# Patient Record
Sex: MALE | Race: WHITE | NOT HISPANIC OR LATINO | Employment: OTHER | URBAN - METROPOLITAN AREA
[De-identification: names, ages, dates, MRNs, and addresses within clinical notes are randomized per-mention and may not be internally consistent; named-entity substitution may affect disease eponyms.]

---

## 2017-10-18 ENCOUNTER — HOSPITAL ENCOUNTER (OUTPATIENT)
Facility: MEDICAL CENTER | Age: 62
End: 2017-10-18
Attending: PHYSICIAN ASSISTANT
Payer: MEDICAID

## 2017-10-18 ENCOUNTER — OFFICE VISIT (OUTPATIENT)
Dept: MEDICAL GROUP | Facility: CLINIC | Age: 62
End: 2017-10-18
Payer: MEDICAID

## 2017-10-18 VITALS
HEART RATE: 85 BPM | TEMPERATURE: 98.1 F | RESPIRATION RATE: 16 BRPM | WEIGHT: 233 LBS | DIASTOLIC BLOOD PRESSURE: 84 MMHG | OXYGEN SATURATION: 92 % | HEIGHT: 72 IN | SYSTOLIC BLOOD PRESSURE: 132 MMHG | BODY MASS INDEX: 31.56 KG/M2

## 2017-10-18 DIAGNOSIS — E11.59 TYPE 2 DIABETES MELLITUS WITH OTHER CIRCULATORY COMPLICATION, WITHOUT LONG-TERM CURRENT USE OF INSULIN (HCC): ICD-10-CM

## 2017-10-18 DIAGNOSIS — Z13.6 SCREENING FOR CARDIOVASCULAR CONDITION: ICD-10-CM

## 2017-10-18 DIAGNOSIS — I50.43 ACUTE ON CHRONIC COMBINED SYSTOLIC AND DIASTOLIC CONGESTIVE HEART FAILURE (HCC): ICD-10-CM

## 2017-10-18 DIAGNOSIS — R93.1 LEFT VENTRICULAR EJECTION FRACTION LESS THAN 40%: ICD-10-CM

## 2017-10-18 DIAGNOSIS — E66.9 OBESITY (BMI 30-39.9): ICD-10-CM

## 2017-10-18 DIAGNOSIS — J44.1 COPD WITH ACUTE EXACERBATION (HCC): ICD-10-CM

## 2017-10-18 PROBLEM — E11.9 DIABETES MELLITUS (HCC): Status: ACTIVE | Noted: 2017-10-18

## 2017-10-18 LAB
CREAT UR-MCNC: 41.8 MG/DL
HBA1C MFR BLD: 10.5 % (ref ?–5.8)
INT CON NEG: NEGATIVE
INT CON POS: POSITIVE
MICROALBUMIN UR-MCNC: <0.7 MG/DL
MICROALBUMIN/CREAT UR: NORMAL MG/G (ref 0–30)

## 2017-10-18 PROCEDURE — 83036 HEMOGLOBIN GLYCOSYLATED A1C: CPT | Performed by: PHYSICIAN ASSISTANT

## 2017-10-18 PROCEDURE — 82570 ASSAY OF URINE CREATININE: CPT

## 2017-10-18 PROCEDURE — 99215 OFFICE O/P EST HI 40 MIN: CPT | Performed by: PHYSICIAN ASSISTANT

## 2017-10-18 PROCEDURE — 82043 UR ALBUMIN QUANTITATIVE: CPT

## 2017-10-18 RX ORDER — POTASSIUM CHLORIDE 1500 MG/1
20 TABLET, EXTENDED RELEASE ORAL 2 TIMES DAILY
COMMUNITY
End: 2017-11-08 | Stop reason: SDUPTHER

## 2017-10-18 RX ORDER — FUROSEMIDE 40 MG/1
40 TABLET ORAL DAILY
COMMUNITY
End: 2017-11-08 | Stop reason: SDUPTHER

## 2017-10-18 RX ORDER — PRAVASTATIN SODIUM 20 MG
20 TABLET ORAL
Qty: 30 TAB | Refills: 11 | Status: SHIPPED | OUTPATIENT
Start: 2017-10-18 | End: 2017-11-08 | Stop reason: SDUPTHER

## 2017-10-18 RX ORDER — METOPROLOL SUCCINATE 25 MG/1
25 TABLET, EXTENDED RELEASE ORAL DAILY
COMMUNITY
End: 2017-11-08 | Stop reason: SDUPTHER

## 2017-10-18 RX ORDER — ROPINIROLE 0.25 MG/1
0.25 TABLET, FILM COATED ORAL 3 TIMES DAILY
COMMUNITY
End: 2017-11-08 | Stop reason: SDUPTHER

## 2017-10-18 RX ORDER — GLIPIZIDE 10 MG/1
10 TABLET ORAL 2 TIMES DAILY
COMMUNITY
End: 2017-11-08 | Stop reason: SDUPTHER

## 2017-10-18 RX ORDER — ASPIRIN 81 MG/1
81 TABLET, CHEWABLE ORAL DAILY
COMMUNITY
End: 2017-11-08 | Stop reason: SDUPTHER

## 2017-10-18 RX ORDER — LISINOPRIL 10 MG/1
10 TABLET ORAL DAILY
COMMUNITY
End: 2017-11-08 | Stop reason: SDUPTHER

## 2017-10-18 ASSESSMENT — PATIENT HEALTH QUESTIONNAIRE - PHQ9
SUM OF ALL RESPONSES TO PHQ QUESTIONS 1-9: 6
5. POOR APPETITE OR OVEREATING: 0 - NOT AT ALL
CLINICAL INTERPRETATION OF PHQ2 SCORE: 4

## 2017-10-18 NOTE — PROGRESS NOTES
Chief Complaint   Patient presents with   • Hospital Follow-up     hypertension/blood sugar       HISTORY OF THE PRESENT ILLNESS: This is a 62 y.o. male new patient to our practice who presents today for evaluation and management of:    Diabetes mellitus (CMS-[facility])  Last A1c: 10.7 10/5/17   DM Medications: metformin and glipizide  Patient reports good medication compliance.   HTN: Blood pressure goal <140/<90 Yes  ACE: lisinopril  Hyperlipidemia: Cholesterol goal LDL <100 unknown, no recent labwork  Currently Rx Statin: none currently  Diabetic diet: No  Exercise: walking for about 15-60 minutes after meals   Last monofilament foot exam:  Checks feet at home: No, no sores currently   Last Eye exam: none recently   Kidney function: wnl at check inpatient at [place].   Microalbumin screening: none recently  Has patient received flu vaccine: Yes  Has patient received Hep B series:Yes    A1c goal <7 No  Current barriers to control include money.   Glucose monitoring frequency: every morning and sometimes during the day  Fasting sugars: 180's. Post-prandial sugars: 200's  Hypoglycemic episodes No  Diabetic complications: cardiovascular disease    The patient is not taking ASA every day and is taking all other medications as prescribed. Patient denies any side effects of medication.      Acute on chronic combined systolic and diastolic congestive heart failure (CMS-HCC)  Recently diagnosed in [facility]. No cardiology referral completed at this time. Patient has a new DMII diagnosis which may be causation for this problem. He has shortness of breath on walking short distances but not in conversation or at rest.     COPD with acute exacerbation (CMS-HCC)  Patient smoked for about 40 years and is now suffering the consequences. He states he wishes he never smoked.He has been using a friend's spiriva powder inhaler occasionally, as a rescue medication and has had no adverse side effects but feels it works really well to  help his breathing. He would like to try this medication for himself.    Left ventricular ejection fraction less than 40%  Reported  At exactly 40% on echo at [facility].    Obesity (BMI 30-39.9)  Patient has no ability to improve her changes diet due to low income and inability to obtain quality food. He has no transportation and no job at this time. He recently ran out of unemployment checks.      Past Medical History:   Diagnosis Date   • Hypertension        No past surgical history on file.    No family status information on file.   History reviewed. No pertinent family history.    Social History   Substance Use Topics   • Smoking status: Former Smoker     Packs/day: 0.50     Years: 45.00     Quit date: 9/18/2017   • Smokeless tobacco: Never Used   • Alcohol use Yes       Allergies: Review of patient's allergies indicates no known allergies.    Current Outpatient Prescriptions Ordered in [facility]   Medication Sig Dispense Refill   • ropinirole (REQUIP) 0.25 MG Tab Take 0.25 mg by mouth 3 times a day.     • metformin (GLUCOPHAGE) 850 MG Tab Take 850 mg by mouth 2 times a day.     • potassium Chloride ER (K-TAB) 20 MEQ Tab CR tablet Take 20 mEq by mouth 2 times a day.     • metoprolol SR (TOPROL XL) 25 MG TABLET SR 24 HR Take 25 mg by mouth every day.     • glipiZIDE (GLUCOTROL) 10 MG Tab Take 10 mg by mouth 2 times a day.     • lisinopril (PRINIVIL) 10 MG Tab Take 10 mg by mouth every day.     • furosemide (LASIX) 40 MG Tab Take 40 mg by mouth every day.     • pravastatin (PRAVACHOL) 20 MG Tab Take 1 Tab by mouth every bedtime. 30 Tab 11   • aspirin (ASA) 81 MG Chew Tab chewable tablet Take 81 mg by mouth every day.     • ALBUTEROL SULFATE HFA INH Inhale 1 Puff by mouth 2 times a day as needed.     • Tiotropium Bromide Monohydrate (SPIRIVA RESPIMAT) 2.5 MCG/ACT Aero Soln Inhale 2 Puffs by mouth every morning. 1 Inhaler 5     No current Epic-ordered facility-administered medications on file.      Review of  Systems:   Constitutional: Negative for fever, chills, unplanned weight change and malaise/fatigue.   HENT: Negative for ear pain or tinnitus, nosebleeds, congestion, sore throat and neck pain.    Eyes: Positive for blurred vision right eye. Negative for blurred or decreased vision left eye. .   Respiratory: Positive for cough, sputum production, shortness of breath and wheezing.    Cardiovascular: Positive for PND. Negative for chest pain, palpitations, syncope and leg swelling.   Gastrointestinal: Negative for heartburn, nausea, vomiting and abdominal pain.   Genitourinary: Negative for dysuria, urgency and frequency.   Skin: Negative for rash, itching, nail changes or skin changes.   Neurological: Positive for increased painful sensation in feet. Negative for dizziness, tremors, sensory change, focal weakness, tingling and headaches.   Endo/Heme/Allergies: Does not bruise/bleed easily.    Psychiatric/Behavioral: Negative for depression, suicidal ideas and memory loss. The patient is not nervous/anxious and does not have insomnia.  Pt does not use recreational drugs or excessive alcohol.       Exam:  Blood pressure 132/84, pulse 85, temperature 36.7 °C (98.1 °F), resp. rate 16, height 1.829 m (6'), weight 105.7 kg (233 lb), SpO2 92 %.  General:  Overweight male in NAD  Eyes: Conjunctiva clear, lids without ptosis, pupils equal and reactive to light accommodation.  ENMT: Nose and lips without deformity. Nasal mucosa and septum pink without evidence of purulent drainage.  Neck: Supple without masses upon palpation. Thyroid is not visibly enlarged.  Pulmonary: Increased effort with short distance of walking. Normal effort rest decreased breath sounds in left lower lobe. Otherwise, No rales, ronchi, or wheezing upon auscultation.   Cardiovascular: Difficult to auscultate cardio sounds over pulmonary noise. Regular rate and rhythm without murmur. Carotid and radial pulses are intact and equal bilaterally.    Extremities: No clubbing or cyanosis. Bilateral upper and lower extremities without edema.    Neurologic: Deep tendon reflexes 2+/4 bilaterally.   Skin: Warm and dry.  No obvious lesions.  Musculoskeletal: Normal gait.   Psych: Normal mood and affect. Alert and oriented x3. Judgment and insight is normal.  Diabetic monofilament exam: Within normal limits for sensation to 10 pound pressure, pulses full and equal bilaterally, onychomycosis in all toenails bilaterally. No lesions or skin cracking noted.      Medical Decision Making & Discussion:   1. Type 2 diabetes mellitus with other circulatory complication, without long-term current use of insulin (CMS-HCC)  Patient extensively educated on the disease process that is currently occurring in his body. Educated on hemoglobin A1c and insulin. This education took approximately 20 minutes.  - LIPID PANEL  - pravastatin (PRAVACHOL) 20 MG Tab; Take 1 Tab by mouth every bedtime.  Dispense: 30 Tab; Refill: 11  - REFERRAL TO OPHTHALMOLOGY  - POCT  A1C  - Diabetic Monofilament Lower Extremity Exam  - MICROALBUMIN CREAT RATIO URINE (CLINIC COLLECT); Future    2. Obesity (BMI 30-39.9)  - Patient identified as having weight management issue.  Appropriate orders and counseling given.    3. Acute on chronic combined systolic and diastolic congestive heart failure (CMS-HCC)  Patient advised to continue taking Lasix as prescribed. Also advised to consume a low-salt diet.  - REFERRAL TO CARDIOLOGY    4. Left ventricular ejection fraction less than 40%  See number 3 above.    5. COPD with acute exacerbation (CMS-HCC)  Patient advised to use albuterol inhaler on an as-needed basis.  - Tiotropium Bromide Monohydrate (SPIRIVA RESPIMAT) 2.5 MCG/ACT Aero Soln; Inhale 2 Puffs by mouth every morning.  Dispense: 1 Inhaler; Refill: 5    6. Screening for cardiovascular condition  - LIPID PANEL    Total 55 minutes face-to-face time spent with patient, with greater than 50% of the total time  discussing patient's issues and symptoms as listed above in assessment and plan, as well as managing coordination of care for future evaluation and treatment.      Please note that this dictation was created using voice recognition software. I have made every reasonable attempt to correct obvious errors, but I expect that there are errors of grammar and possibly content that I did not discover before finalizing the note.    Return in about 6 weeks (around 11/29/2017) for COPD and diabetes..

## 2017-10-18 NOTE — ASSESSMENT & PLAN NOTE
Last A1c: 10.7 10/5/17   DM Medications: metformin and glipizide  Patient reports good medication compliance.   HTN: Blood pressure goal <140/<90 Yes  ACE: lisinopril  Hyperlipidemia: Cholesterol goal LDL <100 unknown, no recent labwork  Currently Rx Statin: none currently  Diabetic diet: No  Exercise: walking for about 15-60 minutes after meals   Last monofilament foot exam:  Checks feet at home: No, no sores currently   Last Eye exam: none recently   Kidney function: wnl at check inpatient at [place].   Microalbumin screening: none recently  Has patient received flu vaccine: Yes  Has patient received Hep B series:Yes    A1c goal <7 No  Current barriers to control include money.   Glucose monitoring frequency: every morning and sometimes during the day  Fasting sugars: 180's. Post-prandial sugars: 200's  Hypoglycemic episodes No  Diabetic complications: cardiovascular disease    The patient is not taking ASA every day and is taking all other medications as prescribed. Patient denies any side effects of medication.

## 2017-10-18 NOTE — ASSESSMENT & PLAN NOTE
Patient smoked for about 40 years and is now suffering the consequences. He states he wishes he never smoked.He has been using a friend's spiriva powder inhaler occasionally, as a rescue medication and has had no adverse side effects but feels it works really well to help his breathing. He would like to try this medication for himself.

## 2017-10-18 NOTE — ASSESSMENT & PLAN NOTE
Recently diagnosed in [facility]. No cardiology referral completed at this time. Patient has a new DMII diagnosis which may be causation for this problem. He has shortness of breath on walking short distances but not in conversation or at rest.

## 2017-10-18 NOTE — ASSESSMENT & PLAN NOTE
Patient has no ability to improve her changes diet due to low income and inability to obtain quality food. He has no transportation and no job at this time. He recently ran out of unemployment checks.

## 2017-10-31 ENCOUNTER — TELEPHONE (OUTPATIENT)
Dept: MEDICAL GROUP | Facility: PHYSICIAN GROUP | Age: 62
End: 2017-10-31

## 2017-10-31 NOTE — TELEPHONE ENCOUNTER
Notes Recorded by Yumiko Alvarez P.A.-C. on 10/19/2017 at 2:35 PM PDT  I reviewed urine protein screening. Everything is within normal limits and looks good. Return for follow up as scheduled.

## 2017-11-08 DIAGNOSIS — E11.59 TYPE 2 DIABETES MELLITUS WITH OTHER CIRCULATORY COMPLICATION, WITHOUT LONG-TERM CURRENT USE OF INSULIN (HCC): ICD-10-CM

## 2017-11-08 NOTE — TELEPHONE ENCOUNTER
Pt states he need refills on all of his medications.    Was the patient seen in the last year in this department? Yes     Does patient have an active prescription for medications requested? Yes     Received Request Via: Patient

## 2017-11-09 DIAGNOSIS — E11.59 TYPE 2 DIABETES MELLITUS WITH OTHER CIRCULATORY COMPLICATION, WITHOUT LONG-TERM CURRENT USE OF INSULIN (HCC): ICD-10-CM

## 2017-11-09 RX ORDER — FUROSEMIDE 40 MG/1
40 TABLET ORAL DAILY
Qty: 30 TAB | Refills: 2 | Status: SHIPPED | OUTPATIENT
Start: 2017-11-09 | End: 2018-02-23 | Stop reason: SDUPTHER

## 2017-11-09 RX ORDER — PRAVASTATIN SODIUM 20 MG
20 TABLET ORAL
Qty: 30 TAB | Refills: 2 | Status: SHIPPED | OUTPATIENT
Start: 2017-11-09 | End: 2018-02-08 | Stop reason: SDUPTHER

## 2017-11-09 RX ORDER — LISINOPRIL 10 MG/1
10 TABLET ORAL DAILY
Qty: 30 TAB | Refills: 2 | Status: SHIPPED | OUTPATIENT
Start: 2017-11-09 | End: 2018-02-23 | Stop reason: SDUPTHER

## 2017-11-09 RX ORDER — GLIPIZIDE 10 MG/1
10 TABLET ORAL 2 TIMES DAILY
Qty: 60 TAB | Refills: 2 | Status: SHIPPED | OUTPATIENT
Start: 2017-11-09 | End: 2018-03-15 | Stop reason: SDUPTHER

## 2017-11-09 RX ORDER — ALBUTEROL SULFATE 90 UG/1
2 AEROSOL, METERED RESPIRATORY (INHALATION) 2 TIMES DAILY PRN
Qty: 8.5 G | Refills: 2 | Status: SHIPPED | OUTPATIENT
Start: 2017-11-09 | End: 2018-04-04 | Stop reason: SDUPTHER

## 2017-11-09 RX ORDER — METOPROLOL SUCCINATE 25 MG/1
25 TABLET, EXTENDED RELEASE ORAL DAILY
Qty: 30 TAB | Refills: 2 | Status: SHIPPED | OUTPATIENT
Start: 2017-11-09 | End: 2018-04-04 | Stop reason: SDUPTHER

## 2017-11-09 RX ORDER — ROPINIROLE 0.25 MG/1
0.25 TABLET, FILM COATED ORAL 3 TIMES DAILY
Qty: 90 TAB | Refills: 2 | Status: SHIPPED | OUTPATIENT
Start: 2017-11-09 | End: 2018-02-08 | Stop reason: SDUPTHER

## 2017-11-09 RX ORDER — POTASSIUM CHLORIDE 1500 MG/1
20 TABLET, EXTENDED RELEASE ORAL 2 TIMES DAILY
Qty: 60 TAB | Refills: 2 | Status: SHIPPED | OUTPATIENT
Start: 2017-11-09 | End: 2018-04-04 | Stop reason: SDUPTHER

## 2017-11-09 RX ORDER — ASPIRIN 81 MG/1
81 TABLET, CHEWABLE ORAL DAILY
Qty: 100 TAB | Refills: 2 | Status: SHIPPED | OUTPATIENT
Start: 2017-11-09 | End: 2018-04-04 | Stop reason: SDUPTHER

## 2018-02-08 DIAGNOSIS — E11.59 TYPE 2 DIABETES MELLITUS WITH OTHER CIRCULATORY COMPLICATION, WITHOUT LONG-TERM CURRENT USE OF INSULIN (HCC): ICD-10-CM

## 2018-02-08 RX ORDER — ROPINIROLE 0.25 MG/1
0.25 TABLET, FILM COATED ORAL 3 TIMES DAILY
Qty: 45 TAB | Refills: 0 | Status: SHIPPED | OUTPATIENT
Start: 2018-02-08 | End: 2018-04-04 | Stop reason: SDUPTHER

## 2018-02-08 RX ORDER — PRAVASTATIN SODIUM 20 MG
20 TABLET ORAL
Qty: 30 TAB | Refills: 0 | Status: SHIPPED | OUTPATIENT
Start: 2018-02-08 | End: 2018-04-04 | Stop reason: SDUPTHER

## 2018-02-27 RX ORDER — LISINOPRIL 10 MG/1
10 TABLET ORAL DAILY
Qty: 30 TAB | Refills: 0 | Status: SHIPPED | OUTPATIENT
Start: 2018-02-27 | End: 2018-04-04 | Stop reason: SDUPTHER

## 2018-02-27 RX ORDER — FUROSEMIDE 40 MG/1
40 TABLET ORAL DAILY
Qty: 30 TAB | Refills: 0 | Status: SHIPPED | OUTPATIENT
Start: 2018-02-27 | End: 2018-03-05 | Stop reason: SDUPTHER

## 2018-03-05 RX ORDER — FUROSEMIDE 40 MG/1
40 TABLET ORAL DAILY
Qty: 90 TAB | Refills: 0 | Status: SHIPPED | OUTPATIENT
Start: 2018-03-05 | End: 2018-04-04 | Stop reason: SDUPTHER

## 2018-03-06 NOTE — TELEPHONE ENCOUNTER
Was the patient seen in the last year in this department? Yes     Does patient have an active prescription for medications requested? Yes     Received Request Via: Pharmacy     Pharmacy requesting 90 day supply for insurance.

## 2018-03-16 RX ORDER — GLIPIZIDE 10 MG/1
10 TABLET ORAL 2 TIMES DAILY
Qty: 60 TAB | Refills: 2 | Status: SHIPPED | OUTPATIENT
Start: 2018-03-16 | End: 2018-04-04 | Stop reason: SDUPTHER

## 2018-04-04 ENCOUNTER — OFFICE VISIT (OUTPATIENT)
Dept: MEDICAL GROUP | Facility: CLINIC | Age: 63
End: 2018-04-04
Payer: MEDICAID

## 2018-04-04 VITALS
HEIGHT: 72 IN | HEART RATE: 68 BPM | OXYGEN SATURATION: 97 % | TEMPERATURE: 98.2 F | BODY MASS INDEX: 30.61 KG/M2 | DIASTOLIC BLOOD PRESSURE: 84 MMHG | SYSTOLIC BLOOD PRESSURE: 140 MMHG | WEIGHT: 226 LBS

## 2018-04-04 DIAGNOSIS — E11.59 TYPE 2 DIABETES MELLITUS WITH OTHER CIRCULATORY COMPLICATION, WITHOUT LONG-TERM CURRENT USE OF INSULIN (HCC): Primary | ICD-10-CM

## 2018-04-04 DIAGNOSIS — J44.1 COPD WITH ACUTE EXACERBATION (HCC): ICD-10-CM

## 2018-04-04 DIAGNOSIS — I50.43 ACUTE ON CHRONIC COMBINED SYSTOLIC AND DIASTOLIC CONGESTIVE HEART FAILURE (HCC): ICD-10-CM

## 2018-04-04 DIAGNOSIS — Z23 NEED FOR VACCINATION: ICD-10-CM

## 2018-04-04 DIAGNOSIS — G25.81 RESTLESS LEG SYNDROME, CONTROLLED: ICD-10-CM

## 2018-04-04 DIAGNOSIS — S49.92XS SHOULDER INJURY, LEFT, SEQUELA: ICD-10-CM

## 2018-04-04 DIAGNOSIS — E66.9 OBESITY (BMI 30-39.9): ICD-10-CM

## 2018-04-04 DIAGNOSIS — Z98.890 HISTORY OF COLONOSCOPY WITH POLYPECTOMY: ICD-10-CM

## 2018-04-04 DIAGNOSIS — Z86.010 HISTORY OF COLONOSCOPY WITH POLYPECTOMY: ICD-10-CM

## 2018-04-04 LAB
HBA1C MFR BLD: 6.4 % (ref ?–5.8)
INT CON NEG: NEGATIVE
INT CON POS: POSITIVE

## 2018-04-04 PROCEDURE — 90471 IMMUNIZATION ADMIN: CPT | Performed by: PHYSICIAN ASSISTANT

## 2018-04-04 PROCEDURE — 90715 TDAP VACCINE 7 YRS/> IM: CPT | Performed by: PHYSICIAN ASSISTANT

## 2018-04-04 PROCEDURE — 99214 OFFICE O/P EST MOD 30 MIN: CPT | Mod: 25 | Performed by: PHYSICIAN ASSISTANT

## 2018-04-04 PROCEDURE — 83036 HEMOGLOBIN GLYCOSYLATED A1C: CPT | Performed by: PHYSICIAN ASSISTANT

## 2018-04-04 RX ORDER — ROPINIROLE 1 MG/1
1 TABLET, FILM COATED ORAL
Qty: 90 TAB | Refills: 1 | Status: SHIPPED | OUTPATIENT
Start: 2018-04-04 | End: 2018-10-02 | Stop reason: SDUPTHER

## 2018-04-04 RX ORDER — POTASSIUM CHLORIDE 1500 MG/1
20 TABLET, EXTENDED RELEASE ORAL 2 TIMES DAILY
Qty: 180 TAB | Refills: 1 | Status: SHIPPED | OUTPATIENT
Start: 2018-04-04 | End: 2018-10-11 | Stop reason: SDUPTHER

## 2018-04-04 RX ORDER — ASPIRIN 81 MG/1
81 TABLET, CHEWABLE ORAL DAILY
Qty: 200 TAB | Refills: 1 | Status: SHIPPED | OUTPATIENT
Start: 2018-04-04 | End: 2020-03-11 | Stop reason: SDUPTHER

## 2018-04-04 RX ORDER — GLIPIZIDE 10 MG/1
10 TABLET ORAL 2 TIMES DAILY
Qty: 180 TAB | Refills: 1 | Status: SHIPPED | OUTPATIENT
Start: 2018-04-04 | End: 2018-12-20

## 2018-04-04 RX ORDER — PRAVASTATIN SODIUM 20 MG
20 TABLET ORAL
Qty: 90 TAB | Refills: 1 | Status: SHIPPED | OUTPATIENT
Start: 2018-04-04 | End: 2018-12-05 | Stop reason: SDUPTHER

## 2018-04-04 RX ORDER — LISINOPRIL 10 MG/1
10 TABLET ORAL DAILY
Qty: 90 TAB | Refills: 1 | Status: SHIPPED | OUTPATIENT
Start: 2018-04-04 | End: 2018-09-18 | Stop reason: SDUPTHER

## 2018-04-04 RX ORDER — METOPROLOL SUCCINATE 25 MG/1
25 TABLET, EXTENDED RELEASE ORAL DAILY
Qty: 90 TAB | Refills: 1 | Status: SHIPPED | OUTPATIENT
Start: 2018-04-04 | End: 2018-12-05 | Stop reason: SDUPTHER

## 2018-04-04 RX ORDER — FUROSEMIDE 40 MG/1
40 TABLET ORAL DAILY
Qty: 90 TAB | Refills: 1 | Status: SHIPPED | OUTPATIENT
Start: 2018-04-04 | End: 2018-12-05 | Stop reason: SDUPTHER

## 2018-04-04 RX ORDER — ALBUTEROL SULFATE 90 UG/1
2 AEROSOL, METERED RESPIRATORY (INHALATION) 2 TIMES DAILY PRN
Qty: 17 G | Refills: 5 | Status: SHIPPED | OUTPATIENT
Start: 2018-04-04 | End: 2018-12-05 | Stop reason: SDUPTHER

## 2018-04-04 NOTE — ASSESSMENT & PLAN NOTE
Last A1c: 6.4% 4/4/18 down from 10.7 10/5/17   DM Medications: metformin 1000 mg twice per day and glipizide 10 mg twice per day Patient reports excellent medication compliance.   HTN: Blood pressure goal <140/<90 Yes  ACE: lisinopril 10 mg tablet every morning  Hyperlipidemia: Cholesterol goal LDL <100 unknown, no recent labwork  Currently Rx Statin: Pravastatin 20 mg every evening.  Diabetic diet: No  Exercise: walking for about 15-60 minutes after meals   Last monofilament foot exam: October 2017 within normal limits. Checks feet at home: Yes, no sores currently   Last Eye exam: none recently   Kidney function: wnl at check inpatient at [place].   Microalbumin screening: none recently  Has patient received flu vaccine: Yes  Has patient received Hep B series:Yes    A1c goal <7 yes  Current barriers to control include none at this time  Glucose monitoring frequency: every morning and sometimes during the day  Fasting sugars: 120's.   Hypoglycemic episodes yes, when fasting before surgery he did take his metformin and glipizide without eating.  Diabetic complications: cardiovascular disease    The patient is taking ASA every day and is taking all other medications as prescribed. Patient denies any side effects of medication.

## 2018-04-04 NOTE — PROGRESS NOTES
Chief Complaint   Patient presents with   • Diabetes       HISTORY OF PRESENT ILLNESS: Patient is a 62 y.o. male established patient who presents today for evaluation and management of:    Diabetes mellitus (CMS-[facility])  Last A1c: 6.4% 4/4/18 down from 10.7 10/5/17   DM Medications: metformin 1000 mg twice per day and glipizide 10 mg twice per day Patient reports excellent medication compliance.   HTN: Blood pressure goal <140/<90 Yes  ACE: lisinopril 10 mg tablet every morning  Hyperlipidemia: Cholesterol goal LDL <100 unknown, no recent labwork  Currently Rx Statin: Pravastatin 20 mg every evening.  Diabetic diet: No  Exercise: walking for about 15-60 minutes after meals   Last monofilament foot exam: October 2017 within normal limits. Checks feet at home: Yes, no sores currently   Last Eye exam: none recently   Kidney function: wnl at check inpatient at [place].   Microalbumin screening: none recently  Has patient received flu vaccine: Yes  Has patient received Hep B series:Yes    A1c goal <7 yes  Current barriers to control include none at this time  Glucose monitoring frequency: every morning and sometimes during the day  Fasting sugars: 120's.   Hypoglycemic episodes yes, when fasting before surgery he did take his metformin and glipizide without eating.  Diabetic complications: cardiovascular disease    The patient is taking ASA every day and is taking all other medications as prescribed. Patient denies any side effects of medication.      Shoulder injury, left, sequela  Patient states he slipped and fell in the mind and landed on his left shoulder about a month ago. He states that since then when his arm is hanging or without support he experiences pain in his left upper back/shoulder area. He states that he does not experience pain while his arm is active, only when it is hanging down without support. He has not experienced a reduction in strength. He has not tried anything to improve this. He states it seems  to be improving on its own very slowly.    Obesity (BMI 30-39.9)  This patient has lost approximately 7 pounds since October 2017 due to improved exercise and slightly improved diet. He remains dependent on donations for food so his food quality is not always superb but is working hard at improving this.    History of colonoscopy with polypectomy  Patient states that approximately 5 years ago he had an abnormal colonoscopy with polypectomy. He is due for repeat colonoscopy at this time.    COPD with acute exacerbation (CMS-HCC)  Patient smoked for about 40 years and is now suffering the consequences. He states he wishes he never smoked. He has been unable to afford Spiriva but feels that once daily albuterol aerosol is working well to control his current symptoms.    Acute on chronic combined systolic and diastolic congestive heart failure (CMS-HCC)  diagnosed in [facility] in 2017. He has shortness of breath on walking short distances but not in conversation or at rest. He was scheduled for stress echo but due to finances was unable to complete this. He does have a cardiologist at this time.    Restless leg syndrome, controlled  This patient states he has been using 2-3 0.25 mg ropinirole tablets every evening to alleviate his restless legs. He states this works for him but is not completely alleviating his uncontrollable leg movements. He is requesting an increase in this medication today.       Patient Active Problem List    Diagnosis Date Noted   • Shoulder injury, left, sequela 04/04/2018   • History of colonoscopy with polypectomy 04/04/2018   • Restless leg syndrome, controlled 04/04/2018   • Obesity (BMI 30-39.9) 10/18/2017   • Diabetes mellitus (CMS-HCC) 10/18/2017   • Acute on chronic combined systolic and diastolic congestive heart failure (CMS-HCC) 10/18/2017   • COPD with acute exacerbation (CMS-HCC) 10/18/2017   • Left ventricular ejection fraction less than 40% 10/18/2017       Allergies:Patient  has no known allergies.    Current Outpatient Prescriptions   Medication Sig Dispense Refill   • albuterol 108 (90 Base) MCG/ACT Aero Soln inhalation aerosol Inhale 2 Puffs by mouth 2 times a day as needed for Shortness of Breath. 17 g 5   • furosemide (LASIX) 40 MG Tab Take 1 Tab by mouth every day. 90 Tab 1   • potassium Chloride ER (K-TAB) 20 MEQ Tab CR tablet Take 1 Tab by mouth 2 times a day. 180 Tab 1   • ROPINIRole (REQUIP) 1 MG Tab Take 1 Tab by mouth every bedtime. 90 Tab 1   • aspirin (ASA) 81 MG Chew Tab chewable tablet Take 1 Tab by mouth every day. 200 Tab 1   • glucose blood strip 1 Strip by Other route every day. 100 Strip 1   • glipiZIDE (GLUCOTROL) 10 MG Tab Take 1 Tab by mouth 2 times a day. 180 Tab 1   • lisinopril (PRINIVIL) 10 MG Tab Take 1 Tab by mouth every day. 90 Tab 1   • metformin (GLUCOPHAGE) 1000 MG tablet Take 1 Tab by mouth 2 times a day, with meals. 180 Tab 1   • metoprolol SR (TOPROL XL) 25 MG TABLET SR 24 HR Take 1 Tab by mouth every day. 90 Tab 1   • pravastatin (PRAVACHOL) 20 MG Tab Take 1 Tab by mouth every bedtime. 90 Tab 1   • Diclofenac Sodium 1 % Gel Apply 0.5 g to skin as directed 2 times a day as needed. 30 g 0     No current facility-administered medications for this visit.        Social History   Substance Use Topics   • Smoking status: Former Smoker     Packs/day: 0.50     Years: 45.00     Quit date: 2017   • Smokeless tobacco: Never Used   • Alcohol use 1.2 oz/week     2 Cans of beer per week       Family Status   Relation Status   • Mother    • Father    History reviewed. No pertinent family history.    Review of Systems: See history of present illness above.  Constitutional: Negative for fever, chills, unintentional weight loss and malaise/fatigue.   HENT: Negative for ear pain, nosebleeds, congestion, sore throat and neck pain.    Eyes: Negative for blurred vision.   Respiratory: Positive for cough, sputum production, shortness of breath and  wheezing.    Cardiovascular: Negative for chest pain, palpitations, orthopnea and leg swelling.   Gastrointestinal: Negative for heartburn, nausea, vomiting and abdominal pain.   Musculoskeletal: See history of present illness above. Negative for myalgias, back pain  Skin: Negative for rash and itching.   Neurological: Negative for dizziness, tingling, tremors, sensory change, focal weakness and headaches.   Endo/Heme/Allergies: Does not bruise/bleed easily.   Psychiatric/Behavioral: Negative for depression, suicidal ideas and memory loss.  The patient is not nervous/anxious and does not have insomnia.      Exam:  Blood pressure 140/84, pulse 68, temperature 36.8 °C (98.2 °F), height 1.829 m (6'), weight 102.5 kg (226 lb), SpO2 97 %.  Body mass index is 30.65 kg/m².  General:  Overweight male in NAD  Head: is grossly normal.  Neck: Supple without masses. Thyroid is not visibly enlarged.  Pulmonary: Fine crackles to auscultation in all lobes bilaterally. Normal effort. No  ronchi, or wheezing.  Cardiovascular: Regular rate and rhythm without murmur. Carotid and radial pulses are intact and equal bilaterally.  Extremities: no clubbing, cyanosis, or edema.  Musculoskeletal: Tenderness to palpation at the muscles directly above the midpoint on the spine of the scapula between. No tenderness to palpation of glenohumeral joint. No reduced strength.      Medical decision-making and discussion:  1. Need for vaccination  - Tdap =>8yo IM    2. Type 2 diabetes mellitus with other circulatory complication, without long-term current use of insulin (CMS-HCC)  Patient was advised to continue his current medication regimen as it is working well to keep his blood sugars under control.  - aspirin (ASA) 81 MG Chew Tab chewable tablet; Take 1 Tab by mouth every day.  Dispense: 200 Tab; Refill: 1  - glucose blood strip; 1 Strip by Other route every day.  Dispense: 100 Strip; Refill: 1  - glipiZIDE (GLUCOTROL) 10 MG Tab; Take 1 Tab by  mouth 2 times a day.  Dispense: 180 Tab; Refill: 1  - lisinopril (PRINIVIL) 10 MG Tab; Take 1 Tab by mouth every day.  Dispense: 90 Tab; Refill: 1  - metformin (GLUCOPHAGE) 1000 MG tablet; Take 1 Tab by mouth 2 times a day, with meals.  Dispense: 180 Tab; Refill: 1  - pravastatin (PRAVACHOL) 20 MG Tab; Take 1 Tab by mouth every bedtime.  Dispense: 90 Tab; Refill: 1  - LIPID PANEL  - CMP (12)  - HEMOGLOBIN A1C  - POCT A1C    3. COPD with acute exacerbation (CMS-HCC)  - albuterol 108 (90 Base) MCG/ACT Aero Soln inhalation aerosol; Inhale 2 Puffs by mouth 2 times a day as needed for Shortness of Breath.  Dispense: 17 g; Refill: 5  - aspirin (ASA) 81 MG Chew Tab chewable tablet; Take 1 Tab by mouth every day.  Dispense: 200 Tab; Refill: 1    4. Acute on chronic combined systolic and diastolic congestive heart failure (CMS-HCC)  Continue follow-up with cardiology and complete stress echo as soon as possible. Patient was informed that I am happy to order this if he needs it reordered by the time he is able to afford the test.  - furosemide (LASIX) 40 MG Tab; Take 1 Tab by mouth every day.  Dispense: 90 Tab; Refill: 1  - potassium Chloride ER (K-TAB) 20 MEQ Tab CR tablet; Take 1 Tab by mouth 2 times a day.  Dispense: 180 Tab; Refill: 1  - aspirin (ASA) 81 MG Chew Tab chewable tablet; Take 1 Tab by mouth every day.  Dispense: 200 Tab; Refill: 1  - lisinopril (PRINIVIL) 10 MG Tab; Take 1 Tab by mouth every day.  Dispense: 90 Tab; Refill: 1  - metoprolol SR (TOPROL XL) 25 MG TABLET SR 24 HR; Take 1 Tab by mouth every day.  Dispense: 90 Tab; Refill: 1    5. Shoulder injury, left, sequela  Patient was advised to complete gentle back strengthening exercises and use the following medication for pain relief. He feels he will be unable to afford any other care at this time.  - Diclofenac Sodium 1 % Gel; Apply 0.5 g to skin as directed 2 times a day as needed.  Dispense: 30 g; Refill: 0    6. Restless leg syndrome, controlled  following medication is an increase from her prior 0.25 mg dose. The patient was advised to take only one of these tablets each evening before bed.  - ROPINIRole (REQUIP) 1 MG Tab; Take 1 Tab by mouth every bedtime.  Dispense: 90 Tab; Refill: 1    7. Obesity (BMI 30-39.9)    - Patient identified as having weight management issue.  Appropriate orders and counseling given.    8. History of colonoscopy with polypectomy  - REFERRAL TO GI FOR COLONOSCOPY  - CBC WITHOUT DIFFERENTIAL; Future      Please note that this dictation was created using voice recognition software. I have made every reasonable attempt to correct obvious errors, but I expect that there are errors of grammar and possibly content that I did not discover before finalizing the note.      Return in about 6 months (around 10/4/2018) for follow-up diabetes and COPD.

## 2018-04-04 NOTE — ASSESSMENT & PLAN NOTE
Patient smoked for about 40 years and is now suffering the consequences. He states he wishes he never smoked. He has been unable to afford Spiriva but feels that once daily albuterol aerosol is working well to control his current symptoms.

## 2018-04-04 NOTE — ASSESSMENT & PLAN NOTE
Patient states he slipped and fell in the mind and landed on his left shoulder about a month ago. He states that since then when his arm is hanging or without support he experiences pain in his left upper back/shoulder area. He states that he does not experience pain while his arm is active, only when it is hanging down without support. He has not experienced a reduction in strength. He has not tried anything to improve this. He states it seems to be improving on its own very slowly.

## 2018-04-04 NOTE — ASSESSMENT & PLAN NOTE
diagnosed in [facility] in 2017. He has shortness of breath on walking short distances but not in conversation or at rest. He was scheduled for stress echo but due to finances was unable to complete this. He does have a cardiologist at this time.

## 2018-04-04 NOTE — ASSESSMENT & PLAN NOTE
This patient has lost approximately 7 pounds since October 2017 due to improved exercise and slightly improved diet. He remains dependent on donations for food so his food quality is not always superb but is working hard at improving this.

## 2018-04-04 NOTE — ASSESSMENT & PLAN NOTE
Patient states that approximately 5 years ago he had an abnormal colonoscopy with polypectomy. He is due for repeat colonoscopy at this time.

## 2018-04-04 NOTE — ASSESSMENT & PLAN NOTE
This patient states he has been using 2-3 0.25 mg ropinirole tablets every evening to alleviate his restless legs. He states this works for him but is not completely alleviating his uncontrollable leg movements. He is requesting an increase in this medication today.

## 2018-04-09 DIAGNOSIS — E11.59 TYPE 2 DIABETES MELLITUS WITH OTHER CIRCULATORY COMPLICATION, WITHOUT LONG-TERM CURRENT USE OF INSULIN (HCC): ICD-10-CM

## 2018-04-09 NOTE — TELEPHONE ENCOUNTER
[facility] asking for a refiil for a OneTouch Ultramini Meter    Was the patient seen in the last year in this department? Yes     Does patient have an active prescription for medications requested? Yes     Received Request Via: Pharmacy

## 2018-09-18 DIAGNOSIS — E11.59 TYPE 2 DIABETES MELLITUS WITH OTHER CIRCULATORY COMPLICATION, WITHOUT LONG-TERM CURRENT USE OF INSULIN (HCC): ICD-10-CM

## 2018-09-18 DIAGNOSIS — I50.43 ACUTE ON CHRONIC COMBINED SYSTOLIC AND DIASTOLIC CONGESTIVE HEART FAILURE (HCC): ICD-10-CM

## 2018-09-18 RX ORDER — LISINOPRIL 10 MG/1
TABLET ORAL
Qty: 90 TAB | Refills: 1 | Status: SHIPPED | OUTPATIENT
Start: 2018-09-18 | End: 2018-12-05 | Stop reason: SDUPTHER

## 2018-10-11 DIAGNOSIS — E11.59 TYPE 2 DIABETES MELLITUS WITH OTHER CIRCULATORY COMPLICATION, WITHOUT LONG-TERM CURRENT USE OF INSULIN (HCC): ICD-10-CM

## 2018-10-11 DIAGNOSIS — I50.43 ACUTE ON CHRONIC COMBINED SYSTOLIC AND DIASTOLIC CONGESTIVE HEART FAILURE (HCC): ICD-10-CM

## 2018-10-11 RX ORDER — POTASSIUM CHLORIDE 20 MEQ/1
TABLET, EXTENDED RELEASE ORAL
Qty: 180 TAB | Refills: 1 | Status: SHIPPED | OUTPATIENT
Start: 2018-10-11 | End: 2018-12-20 | Stop reason: SDUPTHER

## 2018-10-31 DIAGNOSIS — E11.59 TYPE 2 DIABETES MELLITUS WITH OTHER CIRCULATORY COMPLICATION, WITHOUT LONG-TERM CURRENT USE OF INSULIN (HCC): ICD-10-CM

## 2018-10-31 DIAGNOSIS — G25.81 RESTLESS LEG SYNDROME, CONTROLLED: ICD-10-CM

## 2018-10-31 RX ORDER — ROPINIROLE 1 MG/1
1 TABLET, FILM COATED ORAL
Qty: 90 TAB | Refills: 0 | OUTPATIENT
Start: 2018-10-31

## 2018-10-31 NOTE — TELEPHONE ENCOUNTER
Was the patient seen in the last year in this department? Yes    Does patient have an active prescription for medications requested? Yes    Received Request Via: Pharmacy     Per insurance needs a 90 day supply or they will not fill.    Last OV:4/4/18  Last Lab:  4/4/18 A1C- Never completed 4/4/18 Lab orders

## 2018-12-05 DIAGNOSIS — I50.43 ACUTE ON CHRONIC COMBINED SYSTOLIC AND DIASTOLIC CONGESTIVE HEART FAILURE (HCC): ICD-10-CM

## 2018-12-05 DIAGNOSIS — J44.1 COPD WITH ACUTE EXACERBATION (HCC): ICD-10-CM

## 2018-12-05 DIAGNOSIS — E11.59 TYPE 2 DIABETES MELLITUS WITH OTHER CIRCULATORY COMPLICATION, WITHOUT LONG-TERM CURRENT USE OF INSULIN (HCC): ICD-10-CM

## 2018-12-05 RX ORDER — ALBUTEROL SULFATE 90 UG/1
2 AEROSOL, METERED RESPIRATORY (INHALATION) 2 TIMES DAILY PRN
Qty: 17 G | Refills: 0 | Status: SHIPPED | OUTPATIENT
Start: 2018-12-05 | End: 2018-12-20 | Stop reason: SDUPTHER

## 2018-12-05 RX ORDER — METOPROLOL SUCCINATE 25 MG/1
25 TABLET, EXTENDED RELEASE ORAL DAILY
Qty: 30 TAB | Refills: 0 | Status: SHIPPED | OUTPATIENT
Start: 2018-12-05 | End: 2018-12-20

## 2018-12-05 RX ORDER — PRAVASTATIN SODIUM 20 MG
20 TABLET ORAL
Qty: 30 TAB | Refills: 0 | Status: SHIPPED | OUTPATIENT
Start: 2018-12-05 | End: 2018-12-20 | Stop reason: SDUPTHER

## 2018-12-05 RX ORDER — LISINOPRIL 10 MG/1
10 TABLET ORAL
Qty: 30 TAB | Refills: 0 | Status: SHIPPED | OUTPATIENT
Start: 2018-12-05 | End: 2018-12-20 | Stop reason: SDUPTHER

## 2018-12-05 RX ORDER — FUROSEMIDE 40 MG/1
40 TABLET ORAL DAILY
Qty: 30 TAB | Refills: 0 | Status: SHIPPED | OUTPATIENT
Start: 2018-12-05 | End: 2018-12-20 | Stop reason: SDUPTHER

## 2018-12-05 NOTE — TELEPHONE ENCOUNTER
Was the patient seen in the last year in this department? Yes    Does patient have an active prescription for medications requested? Yes    Received Request Via: Patient    Last office:    Pt is doing labs asap. 04/04/2018

## 2018-12-18 ENCOUNTER — HOSPITAL ENCOUNTER (OUTPATIENT)
Facility: MEDICAL CENTER | Age: 63
End: 2018-12-18
Attending: PHYSICIAN ASSISTANT
Payer: MEDICAID

## 2018-12-18 ENCOUNTER — NON-PROVIDER VISIT (OUTPATIENT)
Dept: MEDICAL GROUP | Facility: CLINIC | Age: 63
End: 2018-12-18
Payer: MEDICAID

## 2018-12-18 PROCEDURE — 36415 COLL VENOUS BLD VENIPUNCTURE: CPT | Performed by: PHYSICIAN ASSISTANT

## 2018-12-18 PROCEDURE — 99000 SPECIMEN HANDLING OFFICE-LAB: CPT | Performed by: PHYSICIAN ASSISTANT

## 2018-12-18 PROCEDURE — 83036 HEMOGLOBIN GLYCOSYLATED A1C: CPT

## 2018-12-18 PROCEDURE — 80061 LIPID PANEL: CPT

## 2018-12-18 PROCEDURE — 80053 COMPREHEN METABOLIC PANEL: CPT

## 2018-12-19 LAB
ALBUMIN SERPL BCP-MCNC: 4.6 G/DL (ref 3.2–4.9)
ALBUMIN/GLOB SERPL: 1.5 G/DL
ALP SERPL-CCNC: 77 U/L (ref 30–99)
ALT SERPL-CCNC: 23 U/L (ref 2–50)
ANION GAP SERPL CALC-SCNC: 8 MMOL/L (ref 0–11.9)
AST SERPL-CCNC: 25 U/L (ref 12–45)
BILIRUB SERPL-MCNC: 0.5 MG/DL (ref 0.1–1.5)
BUN SERPL-MCNC: 13 MG/DL (ref 8–22)
CALCIUM SERPL-MCNC: 9.9 MG/DL (ref 8.5–10.5)
CHLORIDE SERPL-SCNC: 102 MMOL/L (ref 96–112)
CHOLEST SERPL-MCNC: 156 MG/DL (ref 100–199)
CO2 SERPL-SCNC: 30 MMOL/L (ref 20–33)
CREAT SERPL-MCNC: 0.97 MG/DL (ref 0.5–1.4)
GLOBULIN SER CALC-MCNC: 3.1 G/DL (ref 1.9–3.5)
GLUCOSE SERPL-MCNC: 75 MG/DL (ref 65–99)
HDLC SERPL-MCNC: 37 MG/DL
LDLC SERPL CALC-MCNC: 95 MG/DL
POTASSIUM SERPL-SCNC: 4.8 MMOL/L (ref 3.6–5.5)
PROT SERPL-MCNC: 7.7 G/DL (ref 6–8.2)
SODIUM SERPL-SCNC: 140 MMOL/L (ref 135–145)
TRIGL SERPL-MCNC: 118 MG/DL (ref 0–149)

## 2018-12-20 ENCOUNTER — OFFICE VISIT (OUTPATIENT)
Dept: MEDICAL GROUP | Facility: CLINIC | Age: 63
End: 2018-12-20
Payer: MEDICAID

## 2018-12-20 ENCOUNTER — TELEPHONE (OUTPATIENT)
Dept: MEDICAL GROUP | Facility: CLINIC | Age: 63
End: 2018-12-20

## 2018-12-20 VITALS
WEIGHT: 234 LBS | BODY MASS INDEX: 31.69 KG/M2 | SYSTOLIC BLOOD PRESSURE: 150 MMHG | OXYGEN SATURATION: 96 % | TEMPERATURE: 97.3 F | HEART RATE: 83 BPM | DIASTOLIC BLOOD PRESSURE: 92 MMHG | HEIGHT: 72 IN | RESPIRATION RATE: 16 BRPM

## 2018-12-20 DIAGNOSIS — Z23 NEED FOR VACCINATION: ICD-10-CM

## 2018-12-20 DIAGNOSIS — G25.81 RESTLESS LEG SYNDROME, CONTROLLED: ICD-10-CM

## 2018-12-20 DIAGNOSIS — E11.59 TYPE 2 DIABETES MELLITUS WITH OTHER CIRCULATORY COMPLICATION, WITHOUT LONG-TERM CURRENT USE OF INSULIN (HCC): ICD-10-CM

## 2018-12-20 DIAGNOSIS — I50.43 ACUTE ON CHRONIC COMBINED SYSTOLIC AND DIASTOLIC CONGESTIVE HEART FAILURE (HCC): ICD-10-CM

## 2018-12-20 DIAGNOSIS — J44.1 COPD WITH ACUTE EXACERBATION (HCC): ICD-10-CM

## 2018-12-20 LAB
HBA1C MFR BLD: 6.9 % (ref ?–5.8)
INT CON NEG: NEGATIVE
INT CON POS: POSITIVE

## 2018-12-20 PROCEDURE — 90471 IMMUNIZATION ADMIN: CPT | Performed by: PHYSICIAN ASSISTANT

## 2018-12-20 PROCEDURE — 99214 OFFICE O/P EST MOD 30 MIN: CPT | Mod: 25 | Performed by: PHYSICIAN ASSISTANT

## 2018-12-20 PROCEDURE — 90686 IIV4 VACC NO PRSV 0.5 ML IM: CPT | Performed by: PHYSICIAN ASSISTANT

## 2018-12-20 PROCEDURE — 83036 HEMOGLOBIN GLYCOSYLATED A1C: CPT | Performed by: PHYSICIAN ASSISTANT

## 2018-12-20 RX ORDER — POTASSIUM CHLORIDE 20 MEQ/1
20 TABLET, EXTENDED RELEASE ORAL 2 TIMES DAILY
Qty: 180 TAB | Refills: 1 | Status: SHIPPED | OUTPATIENT
Start: 2018-12-20 | End: 2020-03-23

## 2018-12-20 RX ORDER — METOPROLOL SUCCINATE 25 MG/1
25 TABLET, EXTENDED RELEASE ORAL DAILY
Qty: 90 TAB | Refills: 1 | Status: SHIPPED | OUTPATIENT
Start: 2018-12-20 | End: 2019-07-10 | Stop reason: SDUPTHER

## 2018-12-20 RX ORDER — ALBUTEROL SULFATE 90 UG/1
2 AEROSOL, METERED RESPIRATORY (INHALATION) 2 TIMES DAILY PRN
Qty: 17 G | Refills: 5 | Status: SHIPPED | OUTPATIENT
Start: 2018-12-20 | End: 2020-03-11 | Stop reason: SDUPTHER

## 2018-12-20 RX ORDER — ROPINIROLE 1 MG/1
1-3 TABLET, FILM COATED ORAL
Qty: 180 TAB | Refills: 1 | Status: SHIPPED | OUTPATIENT
Start: 2018-12-20 | End: 2019-02-21 | Stop reason: SDUPTHER

## 2018-12-20 RX ORDER — PRAVASTATIN SODIUM 20 MG
20 TABLET ORAL
Qty: 90 TAB | Refills: 1 | Status: SHIPPED | OUTPATIENT
Start: 2018-12-20 | End: 2019-07-10 | Stop reason: SDUPTHER

## 2018-12-20 RX ORDER — LISINOPRIL 10 MG/1
10 TABLET ORAL
Qty: 90 TAB | Refills: 1 | Status: SHIPPED | OUTPATIENT
Start: 2018-12-20 | End: 2019-07-10 | Stop reason: SDUPTHER

## 2018-12-20 RX ORDER — FUROSEMIDE 40 MG/1
40 TABLET ORAL DAILY
Qty: 90 TAB | Refills: 1 | Status: SHIPPED | OUTPATIENT
Start: 2018-12-20 | End: 2019-08-28 | Stop reason: SDUPTHER

## 2018-12-20 ASSESSMENT — PATIENT HEALTH QUESTIONNAIRE - PHQ9: CLINICAL INTERPRETATION OF PHQ2 SCORE: 0

## 2018-12-20 NOTE — TELEPHONE ENCOUNTER
DOCUMENTATION OF PAR STATUS:    1. Name of Medication & Dose: metoprolol SR (TOPROL XL) 25 MG TABLET SR 24 HR       2. Name of Prescription Coverage Company & phone #: Medicaid FFS     3. Date Prior Auth Submitted: 12/20/2018    4. What information was given to obtain insurance decision? All information regarding Heart Failure    5. Prior Auth Status? Pending    6. Patient Notified: no

## 2018-12-20 NOTE — ASSESSMENT & PLAN NOTE
This patient states he has been using 2-3 1.0 mg ropinirole tablets every evening to alleviate his restless legs. He states this works for him. He is requesting an refill on this medication today.

## 2018-12-20 NOTE — ASSESSMENT & PLAN NOTE
Patient smoked for about 40 years. He now is only using albuterol on a rescue basis to control exacerbations of this as he is unabel to afford daily medicaitons.

## 2018-12-20 NOTE — PROGRESS NOTES
Chief Complaint   Patient presents with   • Diabetes     [facility] work        HISTORY OF PRESENT ILLNESS: Patient is a 63 y.o. male established patient who presents today for evaluation and management of:    Diabetes mellitus (CMS-HCC) (Formerly [facility])  Last A1c: 6.9% 12/20/18 increased from 6.4% 4/4/18  DM Medications: metformin 1000 mg twice per day and glipizide 10 mg twice per day Patient reports excellent medication compliance.   HTN: Blood pressure goal <140/<90 Yes  ACE: lisinopril 10 mg tablet every morning  Hyperlipidemia: Cholesterol goal LDL <100 yes   Currently Rx Statin: Pravastatin 20 mg every evening.  Diabetic diet: No  Exercise: walking for about 15-60 minutes after meals   Last monofilament foot exam: October 2017 within normal limits. Checks feet at home: Yes, no sores currently   Last Eye exam: none recently   Kidney function: GFR >60 12 /18  Microalbumin screening: wnl 12/18  Has patient received flu vaccine: Yes  Has patient received Hep B series:Yes    A1c goal <7 yes  Current barriers to control include occasional hypoglycemia due to glipizide.   Glucose monitoring frequency: every morning and sometimes during the day  Fasting sugars: 140's  Hypoglycemic episodes yes, about once per week.   Diabetic complications: cardiovascular disease    The patient is taking ASA every day and is taking all other medications as prescribed. Patient denies any side effects of medication.      Acute on chronic combined systolic and diastolic congestive heart failure (CMS-HCC) (Formerly [facility])  diagnosed in [facility] in 2017. He is well-controlled at this time.  He does have a cardiologist at this time and continues taking metoprolol, and furosemide as well as lisinopril for hypertension. .    COPD with acute exacerbation (CMS-HCC) ([facility])  Patient smoked for about 40 years. He now is only using albuterol on a rescue basis to control exacerbations of this as he is unabel to afford daily medicaitons.    Restless leg syndrome,  controlled  This patient states he has been using 2-3 1.0 mg ropinirole tablets every evening to alleviate his restless legs. He states this works for him. He is requesting an refill on this medication today.       Patient Active Problem List    Diagnosis Date Noted   • Shoulder injury, left, sequela 04/04/2018   • History of colonoscopy with polypectomy 04/04/2018   • Restless leg syndrome, controlled 04/04/2018   • Obesity (BMI 30-39.9) 10/18/2017   • Diabetes mellitus ([facility]) 10/18/2017   • Acute on chronic combined systolic and diastolic congestive heart failure ([facility]) 10/18/2017   • COPD with acute exacerbation ([facility]) 10/18/2017   • Left ventricular ejection fraction less than 40% 10/18/2017       Allergies:Patient has no known allergies.    Current Outpatient Prescriptions   Medication Sig Dispense Refill   • lisinopril (PRINIVIL) 10 MG Tab Take 1 Tab by mouth every day. 90 Tab 1   • metformin (GLUCOPHAGE) 1000 MG tablet Take 1 Tab by mouth 2 times a day, with meals. 180 Tab 1   • furosemide (LASIX) 40 MG Tab Take 1 Tab by mouth every day. 90 Tab 1   • ROPINIRole (REQUIP) 1 MG Tab Take 1-3 Tabs by mouth every bedtime. 180 Tab 1   • pravastatin (PRAVACHOL) 20 MG Tab Take 1 Tab by mouth every bedtime. 90 Tab 1   • albuterol 108 (90 Base) MCG/ACT Aero Soln inhalation aerosol Inhale 2 Puffs by mouth 2 times a day as needed for Shortness of Breath. 17 g 5   • potassium chloride SA (KDUR) 20 MEQ Tab CR Take 1 Tab by mouth 2 Times a Day. 180 Tab 1   • metoprolol SR (TOPROL XL) 25 MG TABLET SR 24 HR Take 1 Tab by mouth every day. 90 Tab 1   • aspirin (ASA) 81 MG Chew Tab chewable tablet Take 1 Tab by mouth every day. 200 Tab 1   • glucose blood strip 1 Strip by Other route every day. 100 Strip 1   • Diclofenac Sodium 1 % Gel Apply 0.5 g to skin as directed 2 times a day as needed. 30 g 0     No current facility-administered medications for this visit.        Social History   Substance Use Topics   • Smoking status: Former  Smoker     Packs/day: 0.75     Years: 45.00     Types: Cigarettes     Quit date: 2017   • Smokeless tobacco: Never Used   • Alcohol use 1.2 oz/week     2 Cans of beer per week       Family Status   Relation Status   • Mo    • Fa    History reviewed. No pertinent family history.    Review of Systems: See HPI above.   Constitutional: Negative for fever, chills, weight loss and malaise.   HENT: Negative for ear pain, nosebleeds, congestion, sore throat and neck pain.    Eyes: Negative for blurred vision.   Respiratory: positive for well-controlled shortness of breath, cough  and wheezing without sputum production.    Cardiovascular: Negative for chest pain, palpitations, orthopnea and leg swelling.   Gastrointestinal: Negative for heartburn, nausea, vomiting and abdominal pain.   Genitourinary: Negative for dysuria, urgency and frequency.   Musculoskeletal: Negative for myalgias, back pain and joint pain.   Skin: Negative for rash and itching.   Neurological: Negative for dizziness, tingling, tremors, sensory change, focal weakness and headaches.   Endo/Heme/Allergies: Does not bruise/bleed easily.   Psychiatric/Behavioral: Negative for depression, suicidal ideas and memory loss.  The patient is not nervous/anxious and does not have insomnia.      Exam:  Blood pressure 150/92, pulse 83, temperature 36.3 °C (97.3 °F), resp. rate 16, height 1.829 m (6'), weight 106.1 kg (234 lb), SpO2 96 %.  Body mass index is 31.74 kg/m².  General:  Obese male in NAD  Head: is grossly normal.  Neck: Supple without masses. Thyroid is not visibly enlarged.  Pulmonary: expiratory wheeze and diminished to ausculation. Normal effort at rest.   Cardiovascular: Regular rate and rhythm with faint holosytolic murmur. Carotid pulses are intact and equal bilaterally.  Extremities: no clubbing, cyanosis, or edema.    Medical decision-making and discussion:  1. Need for vaccination  - Influenza Vaccine Quad Injection >3Y  (PF)    2. Type 2 diabetes mellitus with other circulatory complication, without long-term current use of insulin (HCC)  Provided patient with sample of ozempic today to begin injecting once weekly at 0.25mg dose. Educated on use of this medication and patient self-administered [place] in office today. Discussed possible side effects and how to prevent these.   - POCT  A1C  - lisinopril (PRINIVIL) 10 MG Tab; Take 1 Tab by mouth every day.  Dispense: 90 Tab; Refill: 1  - metformin (GLUCOPHAGE) 1000 MG tablet; Take 1 Tab by mouth 2 times a day, with meals.  Dispense: 180 Tab; Refill: 1  - pravastatin (PRAVACHOL) 20 MG Tab; Take 1 Tab by mouth every bedtime.  Dispense: 90 Tab; Refill: 1    3. Acute on chronic combined systolic and diastolic congestive heart failure (HCC)    - POCT  A1C  - lisinopril (PRINIVIL) 10 MG Tab; Take 1 Tab by mouth every day.  Dispense: 90 Tab; Refill: 1  - furosemide (LASIX) 40 MG Tab; Take 1 Tab by mouth every day.  Dispense: 90 Tab; Refill: 1  - potassium chloride SA (KDUR) 20 MEQ Tab CR; Take 1 Tab by mouth 2 Times a Day.  Dispense: 180 Tab; Refill: 1  - metoprolol SR (TOPROL XL) 25 MG TABLET SR 24 HR; Take 1 Tab by mouth every day.  Dispense: 90 Tab; Refill: 1    4. Restless leg syndrome, controlled    - ROPINIRole (REQUIP) 1 MG Tab; Take 1-3 Tabs by mouth every bedtime.  Dispense: 180 Tab; Refill: 1    5. COPD with acute exacerbation ([facility])    - albuterol 108 (90 Base) MCG/ACT Aero Soln inhalation aerosol; Inhale 2 Puffs by mouth 2 times a day as needed for Shortness of Breath.  Dispense: 17 g; Refill: 5      Please note that this dictation was created using voice recognition software. I have made every reasonable attempt to correct obvious errors, but I expect that there are errors of grammar and possibly content that I did not discover before finalizing the note.      Return in about 4 weeks (around 1/17/2019) for BP and blood sugars.

## 2018-12-20 NOTE — ASSESSMENT & PLAN NOTE
diagnosed in [facility] in 2017. He is well-controlled at this time.  He does have a cardiologist at this time and continues taking metoprolol, and furosemide as well as lisinopril for hypertension. .

## 2018-12-20 NOTE — ASSESSMENT & PLAN NOTE
Last A1c: 6.9% 12/20/18 increased from 6.4% 4/4/18  DM Medications: metformin 1000 mg twice per day and glipizide 10 mg twice per day Patient reports excellent medication compliance.   HTN: Blood pressure goal <140/<90 Yes  ACE: lisinopril 10 mg tablet every morning  Hyperlipidemia: Cholesterol goal LDL <100 yes   Currently Rx Statin: Pravastatin 20 mg every evening.  Diabetic diet: No  Exercise: walking for about 15-60 minutes after meals   Last monofilament foot exam: October 2017 within normal limits. Checks feet at home: Yes, no sores currently   Last Eye exam: none recently   Kidney function: GFR >60 12 /18  Microalbumin screening: wnl 12/18  Has patient received flu vaccine: Yes  Has patient received Hep B series:Yes    A1c goal <7 yes  Current barriers to control include occasional hypoglycemia due to glipizide.   Glucose monitoring frequency: every morning and sometimes during the day  Fasting sugars: 140's  Hypoglycemic episodes yes, about once per week.   Diabetic complications: cardiovascular disease    The patient is taking ASA every day and is taking all other medications as prescribed. Patient denies any side effects of medication.

## 2018-12-21 LAB — TEST NAME 95000: ABNORMAL

## 2019-02-07 ENCOUNTER — OFFICE VISIT (OUTPATIENT)
Dept: MEDICAL GROUP | Facility: CLINIC | Age: 64
End: 2019-02-07
Payer: MEDICAID

## 2019-02-07 VITALS
DIASTOLIC BLOOD PRESSURE: 78 MMHG | HEIGHT: 72 IN | TEMPERATURE: 98.4 F | WEIGHT: 229.4 LBS | SYSTOLIC BLOOD PRESSURE: 120 MMHG | BODY MASS INDEX: 31.07 KG/M2 | RESPIRATION RATE: 16 BRPM | HEART RATE: 66 BPM | OXYGEN SATURATION: 95 %

## 2019-02-07 DIAGNOSIS — E11.59 TYPE 2 DIABETES MELLITUS WITH OTHER CIRCULATORY COMPLICATION, WITHOUT LONG-TERM CURRENT USE OF INSULIN (HCC): ICD-10-CM

## 2019-02-07 DIAGNOSIS — I50.43 ACUTE ON CHRONIC COMBINED SYSTOLIC AND DIASTOLIC CONGESTIVE HEART FAILURE (HCC): ICD-10-CM

## 2019-02-07 LAB
HBA1C MFR BLD: 7 % (ref ?–5.8)
INT CON NEG: NEGATIVE
INT CON POS: POSITIVE

## 2019-02-07 PROCEDURE — 99213 OFFICE O/P EST LOW 20 MIN: CPT | Performed by: PHYSICIAN ASSISTANT

## 2019-02-07 PROCEDURE — 83036 HEMOGLOBIN GLYCOSYLATED A1C: CPT | Performed by: PHYSICIAN ASSISTANT

## 2019-02-07 RX ORDER — PEN NEEDLE, DIABETIC 30 GX3/16"
1 NEEDLE, DISPOSABLE MISCELLANEOUS DAILY
Qty: 18 EACH | Refills: 3 | Status: SHIPPED | OUTPATIENT
Start: 2019-02-07 | End: 2020-01-13 | Stop reason: SDUPTHER

## 2019-02-07 ASSESSMENT — PATIENT HEALTH QUESTIONNAIRE - PHQ9: CLINICAL INTERPRETATION OF PHQ2 SCORE: 0

## 2019-02-07 NOTE — ASSESSMENT & PLAN NOTE
Last A1c: 7.0% 02/07/19,  6.9% 12/20/18 increased from 6.4% 4/4/18  DM Medications: metformin 1000 mg twice per day and ozempic 0.25mg every 7 days for 8 weeks, with 3 weeks time without.  Patient reports excellent medication compliance.   HTN: Blood pressure goal <140/<90 Yes  ACE: lisinopril 10 mg tablet every morning  Hyperlipidemia: Cholesterol goal LDL <100 yes   Currently Rx Statin: Pravastatin 20 mg every evening.  Diabetic diet: No, very poor diet recently.   Exercise: walking occasionally  Last monofilament foot exam: October 2017 within normal limits. Checks feet at home: Yes, no sores currently   Last Eye exam: none recently   Kidney function: GFR >60 12 /18  Microalbumin screening: wnl 12/18  Has patient received flu vaccine: Yes  Has patient received Hep B series:Yes    A1c goal <7 yes  Current barriers to control include occasional hypoglycemia due to glipizide.   Glucose monitoring frequency: rarely  Fasting sugars:  Doesn't know  Hypoglycemic episodes none recently since stopping glipizide  Diabetic complications: cardiovascular disease    The patient is taking ASA every day and is not taking all other medications as prescribed due to pharmacy not filling ozempic. . Patient denies any side effects of medication.

## 2019-02-07 NOTE — PROGRESS NOTES
Chief Complaint   Patient presents with   • Diabetes     medication refills        HISTORY OF PRESENT ILLNESS: Patient is a 63 y.o. male established patient who presents today for evaluation and management of:    Type 2 diabetes mellitus without complication, without long-term current use of insulin ([facility])  Last A1c: 7.0% 02/07/19,  6.9% 12/20/18 increased from 6.4% 4/4/18  DM Medications: metformin 1000 mg twice per day and ozempic 0.25mg every 7 days for 8 weeks, with 3 weeks time without.  Patient reports excellent medication compliance.   HTN: Blood pressure goal <140/<90 Yes  ACE: lisinopril 10 mg tablet every morning  Hyperlipidemia: Cholesterol goal LDL <100 yes   Currently Rx Statin: Pravastatin 20 mg every evening.  Diabetic diet: No, very poor diet recently.   Exercise: walking occasionally  Last monofilament foot exam: October 2017 within normal limits. Checks feet at home: Yes, no sores currently   Last Eye exam: none recently   Kidney function: GFR >60 12 /18  Microalbumin screening: wnl 12/18  Has patient received flu vaccine: Yes  Has patient received Hep B series:Yes    A1c goal <7 yes  Current barriers to control include occasional hypoglycemia due to glipizide.   Glucose monitoring frequency: rarely  Fasting sugars:  Doesn't know  Hypoglycemic episodes none recently since stopping glipizide  Diabetic complications: cardiovascular disease    The patient is taking ASA every day and is not taking all other medications as prescribed due to pharmacy not filling ozempic. . Patient denies any side effects of medication.      Acute on chronic combined systolic and diastolic congestive heart failure (CMS-HCC) (HCC)  diagnosed in [facility] in 2017. He is well-controlled at this time.  He does have a cardiologist at this time and continues taking metoprolol, and furosemide as well as lisinopril for hypertension. Due to this, his risk for worsening CHF may be greatly reduced with use of Ozempic or Victoza  as these medications are strongly indicated for patient with diabetes and CHF. He is an excellent candidate for these and will benefit from the cardioprotective effects of either of these medications.        Patient Active Problem List    Diagnosis Date Noted   • Shoulder injury, left, sequela 04/04/2018   • History of colonoscopy with polypectomy 04/04/2018   • Restless leg syndrome, controlled 04/04/2018   • Obesity (BMI 30-39.9) 10/18/2017   • Type 2 diabetes mellitus without complication, without long-term current use of insulin ([facility]) 10/18/2017   • Acute on chronic combined systolic and diastolic congestive heart failure ([facility]) 10/18/2017   • COPD with acute exacerbation ([facility]) 10/18/2017   • Left ventricular ejection fraction less than 40% 10/18/2017       Allergies:Patient has no known allergies.    Current Outpatient Prescriptions   Medication Sig Dispense Refill   • Insulin Pen Needle (PEN NEEDLES) 32G X 4 MM Misc 1 Each by Does not apply route every day. 18 Each 3   • Semaglutide 0.25 or 0.5 MG/DOSE Solution Pen-injector Inject 0.5 mg as instructed every 7 days. 3 PEN 3   • lisinopril (PRINIVIL) 10 MG Tab Take 1 Tab by mouth every day. 90 Tab 1   • metformin (GLUCOPHAGE) 1000 MG tablet Take 1 Tab by mouth 2 times a day, with meals. 180 Tab 1   • furosemide (LASIX) 40 MG Tab Take 1 Tab by mouth every day. 90 Tab 1   • ROPINIRole (REQUIP) 1 MG Tab Take 1-3 Tabs by mouth every bedtime. 180 Tab 1   • pravastatin (PRAVACHOL) 20 MG Tab Take 1 Tab by mouth every bedtime. 90 Tab 1   • potassium chloride SA (KDUR) 20 MEQ Tab CR Take 1 Tab by mouth 2 Times a Day. 180 Tab 1   • aspirin (ASA) 81 MG Chew Tab chewable tablet Take 1 Tab by mouth every day. 200 Tab 1   • albuterol 108 (90 Base) MCG/ACT Aero Soln inhalation aerosol Inhale 2 Puffs by mouth 2 times a day as needed for Shortness of Breath. 17 g 5   • metoprolol SR (TOPROL XL) 25 MG TABLET SR 24 HR Take 1 Tab by mouth every day. 90 Tab 1   • glucose blood strip  1 Strip by Other route every day. 100 Strip 1     No current facility-administered medications for this visit.        Social History   Substance Use Topics   • Smoking status: Current Every Day Smoker     Packs/day: 0.75     Years: 45.00     Types: Cigarettes   • Smokeless tobacco: Never Used      Comment: 4-5 cigarettes/day    • Alcohol use 1.2 oz/week     2 Cans of beer per week       Family Status   Relation Status   • Mo    • Fa    History reviewed. No pertinent family history.    Review of Systems: See HPI above.   Constitutional: Negative for fever, chills, weight loss and malaise.   HENT: Negative for ear pain, nosebleeds, congestion, sore throat and neck pain.    Eyes: Negative for blurred vision.   Respiratory: Negative for shortness of breath, cough, sputum production and wheezing.    Cardiovascular: Negative for chest pain, palpitations, orthopnea and leg swelling.   Gastrointestinal: Negative for heartburn, nausea, vomiting and abdominal pain.   Genitourinary: Negative for dysuria, urgency and frequency.   Musculoskeletal: Negative for myalgias, back pain and joint pain.   Skin: Negative for rash and itching.   Neurological: Negative for dizziness, tingling, tremors, sensory change, focal weakness and headaches.   Endo/Heme/Allergies: Does not bruise/bleed easily.   Psychiatric/Behavioral: Negative for depression, suicidal ideas and memory loss.  The patient is not nervous/anxious and does not have insomnia.      Exam:  Blood pressure 120/78, pulse 66, temperature 36.9 °C (98.4 °F), temperature source Temporal, resp. rate 16, height 1.829 m (6'), weight 104.1 kg (229 lb 6.4 oz), SpO2 95 %.  Body mass index is 31.11 kg/m².  General:  Obese male in NAD  Head: is grossly normal.  Neck: Supple without masses. Thyroid is not visibly enlarged.  Pulmonary: expiratory slowing and diffuse fine crackles to ausculation. Normal effort. No ronchi, or wheezing.  Cardiovascular: Regular rate and rhythm  without murmur. Carotid pulses are intact and equal bilaterally.  Extremities: no clubbing, cyanosis, or edema.    Medical decision-making and discussion:  1. Type 2 diabetes mellitus with other circulatory complication, without long-term current use of insulin (HCC)  Advised to improve diet, discussed at length today. Restart ozempic and if needed, start at 0.25 mg dose for a couple of weeks initially to reduce nausea side effect.   - POCT  A1C  - Insulin Pen Needle (PEN NEEDLES) 32G X 4 MM Misc; 1 Each by Does not apply route every day.  Dispense: 18 Each; Refill: 3  - Semaglutide 0.25 or 0.5 MG/DOSE Solution Pen-injector; Inject 0.5 mg as instructed every 7 days.  Dispense: 3 PEN; Refill: 3    2. Acute on chronic combined systolic and diastolic congestive heart failure (HCC)    - POCT  A1C  - Insulin Pen Needle (PEN NEEDLES) 32G X 4 MM Misc; 1 Each by Does not apply route every day.  Dispense: 18 Each; Refill: 3  - Semaglutide 0.25 or 0.5 MG/DOSE Solution Pen-injector; Inject 0.5 mg as instructed every 7 days.  Dispense: 3 PEN; Refill: 3      Please note that this dictation was created using voice recognition software. I have made every reasonable attempt to correct obvious errors, but I expect that there are errors of grammar and possibly content that I did not discover before finalizing the note.      Return in about 6 weeks (around 3/21/2019) for diabetes.

## 2019-02-07 NOTE — ASSESSMENT & PLAN NOTE
diagnosed in [facility] in 2017. He is well-controlled at this time.  He does have a cardiologist at this time and continues taking metoprolol, and furosemide as well as lisinopril for hypertension. Due to this, his risk for worsening CHF may be greatly reduced with use of Ozempic or Victoza as these medications are strongly indicated for patient with diabetes and CHF. He is an excellent candidate for these and will benefit from the cardioprotective effects of either of these medications.

## 2019-02-13 ENCOUNTER — TELEPHONE (OUTPATIENT)
Dept: MEDICAL GROUP | Facility: CLINIC | Age: 64
End: 2019-02-13

## 2019-02-13 NOTE — TELEPHONE ENCOUNTER
Phone Number Called: [phone] (home)     Message: Received a call from pharmacy that patient is upset that PRA is not done. Informed her its started. She asked if I could call pt. And let them know because pt. Is upset at them and thinks its thei fault. Tried to call pt. Number disconnected.     Left Message for patient to call back: N\A    MEDICATION PRIOR AUTHORIZATION NEEDED:    1. Name of Medication: Semaglutide    2. Requested By (Name of Pharmacy): [facility]     3. Is insurance on file current? Yes    4. What is the name & phone number of the 3rd party payor? Medicaid

## 2019-02-21 DIAGNOSIS — G25.81 RESTLESS LEG SYNDROME, CONTROLLED: ICD-10-CM

## 2019-02-21 DIAGNOSIS — I50.43 ACUTE ON CHRONIC COMBINED SYSTOLIC AND DIASTOLIC CONGESTIVE HEART FAILURE (HCC): ICD-10-CM

## 2019-02-21 DIAGNOSIS — E11.9 TYPE 2 DIABETES MELLITUS WITHOUT COMPLICATION, WITHOUT LONG-TERM CURRENT USE OF INSULIN (HCC): ICD-10-CM

## 2019-02-21 RX ORDER — ROPINIROLE 1 MG/1
1-3 TABLET, FILM COATED ORAL
Qty: 180 TAB | Refills: 0 | Status: SHIPPED | OUTPATIENT
Start: 2019-02-21 | End: 2019-07-10 | Stop reason: SDUPTHER

## 2019-02-21 NOTE — TELEPHONE ENCOUNTER
Status: Denied    Length of Approval: N/A    Was patient/pharmacy informed: no    Krupa Pt. Prior auth was denied for Semaglutide. Please advise.

## 2019-02-21 NOTE — TELEPHONE ENCOUNTER
Was the patient seen in the last year in this department? Yes 12/20/18    Does patient have an active prescription for medications requested? Yes    Received Request Via: Patient       Component      Latest Ref Rng & Units 2/7/2019   Glycohemoglobin      % 7.0   Internal Control Negative       Negative   Internal Control Positive       Positive

## 2019-02-22 NOTE — TELEPHONE ENCOUNTER
Yumiko Alvarez P.A.-C.   You Yesterday (9:27 AM)      I SENT [employer] INSTEAD TO SEE IF INSURANCE WILL COVER THAT. Sorry for caps  lock.  (Routing comment)      Phone Number Called: [phone] (home)     Message: Tried to inform pt. Number is disconnected.     Left Message for patient to call back: N\A

## 2019-07-10 ENCOUNTER — OFFICE VISIT (OUTPATIENT)
Dept: MEDICAL GROUP | Facility: CLINIC | Age: 64
End: 2019-07-10
Payer: MEDICAID

## 2019-07-10 VITALS
TEMPERATURE: 98.1 F | HEIGHT: 72 IN | HEART RATE: 82 BPM | BODY MASS INDEX: 31.02 KG/M2 | DIASTOLIC BLOOD PRESSURE: 80 MMHG | SYSTOLIC BLOOD PRESSURE: 160 MMHG | WEIGHT: 229 LBS | OXYGEN SATURATION: 91 %

## 2019-07-10 DIAGNOSIS — I50.43 ACUTE ON CHRONIC COMBINED SYSTOLIC AND DIASTOLIC CONGESTIVE HEART FAILURE (HCC): ICD-10-CM

## 2019-07-10 DIAGNOSIS — E11.9 TYPE 2 DIABETES MELLITUS WITHOUT COMPLICATION, WITHOUT LONG-TERM CURRENT USE OF INSULIN (HCC): ICD-10-CM

## 2019-07-10 DIAGNOSIS — G25.81 RESTLESS LEG SYNDROME, CONTROLLED: ICD-10-CM

## 2019-07-10 LAB
HBA1C MFR BLD: 7.8 % (ref 0–5.6)
INT CON NEG: ABNORMAL
INT CON POS: ABNORMAL

## 2019-07-10 PROCEDURE — 99214 OFFICE O/P EST MOD 30 MIN: CPT | Performed by: PHYSICIAN ASSISTANT

## 2019-07-10 PROCEDURE — 83036 HEMOGLOBIN GLYCOSYLATED A1C: CPT | Performed by: PHYSICIAN ASSISTANT

## 2019-07-10 RX ORDER — PRAVASTATIN SODIUM 20 MG
20 TABLET ORAL
Qty: 90 TAB | Refills: 1 | Status: SHIPPED | OUTPATIENT
Start: 2019-07-10 | End: 2020-01-13 | Stop reason: SDUPTHER

## 2019-07-10 RX ORDER — LISINOPRIL 10 MG/1
10 TABLET ORAL
Qty: 90 TAB | Refills: 1 | Status: SHIPPED | OUTPATIENT
Start: 2019-07-10 | End: 2020-01-13 | Stop reason: SDUPTHER

## 2019-07-10 RX ORDER — ROPINIROLE 1 MG/1
1-3 TABLET, FILM COATED ORAL
Qty: 180 TAB | Refills: 0 | Status: SHIPPED | OUTPATIENT
Start: 2019-07-10 | End: 2019-09-03 | Stop reason: SDUPTHER

## 2019-07-10 RX ORDER — METOPROLOL SUCCINATE 25 MG/1
25 TABLET, EXTENDED RELEASE ORAL DAILY
Qty: 90 TAB | Refills: 1 | Status: SHIPPED | OUTPATIENT
Start: 2019-07-10 | End: 2019-07-17

## 2019-07-11 NOTE — ASSESSMENT & PLAN NOTE
Last A1c: 7.8% 7/11/19 increased from 7.0% 02/07/19,  6.9% 12/20/18 increased from 6.4% 4/4/18  DM Medications: metformin 1000 mg twice per day and victoza unknown dose recently.   Patient reports excellent medication compliance.   HTN: Blood pressure goal <140/<90 Yes  ACE: lisinopril 10 mg tablet every morning  Hyperlipidemia: Cholesterol goal LDL <100 yes   Currently Rx Statin: Pravastatin 20 mg every evening.  Diabetic diet: No, very poor diet for at least 6 months.   Exercise: walking occasionally  Last monofilament foot exam: October 2017 within normal limits. Checks feet at home: Yes, no sores currently   Last Eye exam: none recently   Kidney function: GFR >60 12 /18  Microalbumin screening: wnl 12/18  Has patient received flu vaccine: Yes  Has patient received Hep B series:Yes    A1c goal <7 yes  Current barriers to control include poor diet.   Glucose monitoring frequency: rarely  Fasting sugars:  Doesn't know  Hypoglycemic episodes none recently since stopping glipizide  Diabetic complications: cardiovascular disease    The patient is taking ASA every day and is not taking all other medications as prescribed. . Patient denies any side effects of medication.

## 2019-07-11 NOTE — ASSESSMENT & PLAN NOTE
diagnosed in [facility] in 2017. He is well-controlled at this time.  He does have a cardiologist at this time and continues taking metoprolol, and furosemide as well as lisinopril for hypertension. He is requesting medication refills today.

## 2019-07-11 NOTE — PROGRESS NOTES
Chief Complaint   Patient presents with   • Diabetes Mellitus   • Congestive Heart Failure   • Nerves       HISTORY OF PRESENT ILLNESS: Patient is a 63 y.o. male established patient who presents today for evaluation and management of:  ne  Acute on chronic combined systolic and diastolic congestive heart failure (CMS-HCC) (HCC)  diagnosed in [facility] in 2017. He is well-controlled at this time.  He does have a cardiologist at this time and continues taking metoprolol, and furosemide as well as lisinopril for hypertension. He is requesting medication refills today.     Restless leg syndrome, controlled  This patient states he has been using 3-4 1.0 mg ropinirole tablets every evening to alleviate his restless legs. He states this works for him. He is requesting an refill on this medication today.    Type 2 diabetes mellitus without complication, without long-term current use of insulin ([facility])  Last A1c: 7.8% 7/11/19 increased from 7.0% 02/07/19,  6.9% 12/20/18 increased from 6.4% 4/4/18  DM Medications: metformin 1000 mg twice per day and victoza unknown dose recently.   Patient reports excellent medication compliance.   HTN: Blood pressure goal <140/<90 Yes  ACE: lisinopril 10 mg tablet every morning  Hyperlipidemia: Cholesterol goal LDL <100 yes   Currently Rx Statin: Pravastatin 20 mg every evening.  Diabetic diet: No, very poor diet for at least 6 months.   Exercise: walking occasionally  Last monofilament foot exam: October 2017 within normal limits. Checks feet at home: Yes, no sores currently   Last Eye exam: none recently   Kidney function: GFR >60 12 /18  Microalbumin screening: wnl 12/18  Has patient received flu vaccine: Yes  Has patient received Hep B series:Yes    A1c goal <7 yes  Current barriers to control include poor diet.   Glucose monitoring frequency: rarely  Fasting sugars:  Doesn't know  Hypoglycemic episodes none recently since stopping glipizide  Diabetic complications: cardiovascular  disease    The patient is taking ASA every day and is not taking all other medications as prescribed. . Patient denies any side effects of medication.         Patient Active Problem List    Diagnosis Date Noted   • Shoulder injury, left, sequela 04/04/2018   • History of colonoscopy with polypectomy 04/04/2018   • Restless leg syndrome, controlled 04/04/2018   • Obesity (BMI 30-39.9) 10/18/2017   • Type 2 diabetes mellitus without complication, without long-term current use of insulin ([facility]) 10/18/2017   • Acute on chronic combined systolic and diastolic congestive heart failure ([facility]) 10/18/2017   • COPD with acute exacerbation ([facility]) 10/18/2017   • Left ventricular ejection fraction less than 40% 10/18/2017       Allergies:Patient has no known allergies.    Current Outpatient Prescriptions   Medication Sig Dispense Refill   • metoprolol SR (TOPROL XL) 25 MG TABLET SR 24 HR Take 1 Tab by mouth every day. 90 Tab 1   • metformin (GLUCOPHAGE) 1000 MG tablet Take 1 Tab by mouth 2 times a day, with meals. 180 Tab 1   • lisinopril (PRINIVIL) 10 MG Tab Take 1 Tab by mouth every day. 90 Tab 1   • pravastatin (PRAVACHOL) 20 MG Tab Take 1 Tab by mouth every bedtime. 90 Tab 1   • ROPINIRole (REQUIP) 1 MG Tab Take 1-3 Tabs by mouth every bedtime. 180 Tab 0   • liraglutide (VICTOZA) 18 MG/3ML Solution Pen-injector injection Inject 0.3 mL as instructed every day. PLEASE HAVE PATIENT RETURN TO CLINIC FOR INSTRUCTIONS ON USE PRIOR TO STARTING MEDICATION 27 mL 3   • Insulin Pen Needle (PEN NEEDLES) 32G X 4 MM Misc 1 Each by Does not apply route every day. 18 Each 3   • furosemide (LASIX) 40 MG Tab Take 1 Tab by mouth every day. 90 Tab 1   • potassium chloride SA (KDUR) 20 MEQ Tab CR Take 1 Tab by mouth 2 Times a Day. 180 Tab 1   • glucose blood strip 1 Strip by Other route every day. 100 Strip 1   • aspirin (ASA) 81 MG Chew Tab chewable tablet Take 1 Tab by mouth every day. 200 Tab 1   • albuterol 108 (90 Base) MCG/ACT Aero Soln  inhalation aerosol Inhale 2 Puffs by mouth 2 times a day as needed for Shortness of Breath. 17 g 5     No current facility-administered medications for this visit.        Social History   Substance Use Topics   • Smoking status: Former Smoker     Packs/day: 0.75     Years: 45.00     Types: Cigarettes     Quit date: 2/10/2019   • Smokeless tobacco: Never Used      Comment: 4-5 cigarettes/day    • Alcohol use 1.2 oz/week     2 Cans of beer per week       Family Status   Relation Status   • Mo    • Fa    History reviewed. No pertinent family history.    Review of Systems: See HPI above.   Constitutional: Negative for fever, chills, weight loss and malaise.   HENT: Negative for ear pain, nosebleeds, congestion, sore throat and neck pain.    Eyes: Negative for blurred vision.   Respiratory: Negative for shortness of breath, cough, sputum production and wheezing.    Cardiovascular: Negative for chest pain, palpitations, orthopnea and leg swelling.   Gastrointestinal: Negative for heartburn, nausea, vomiting and abdominal pain.   Genitourinary: Negative for dysuria, urgency and frequency.   Musculoskeletal: Negative for myalgias  Skin: Negative for rash and itching.   Neurological: Negative for dizziness, tremors, and headaches.   Endo/Heme/Allergies: Does not bruise/bleed easily.   Psychiatric/Behavioral: Negative for depression, suicidal ideas and memory loss.  The patient is somewhat nervous/anxious and does not have insomnia.      Exam:  /80 (BP Location: Left arm, Patient Position: Sitting, BP Cuff Size: Adult)   Pulse 82   Temp 36.7 °C (98.1 °F) (Temporal)   Ht 1.829 m (6')   Wt 103.9 kg (229 lb)   SpO2 91%   Body mass index is 31.06 kg/m².  General:  Overweight male in NAD  Head: is grossly normal.  Neck: Supple without masses. Thyroid is not visibly enlarged.  Pulmonary: somewhat diminished to ausculation in all lung fields. Normal effort. No rales, ronchi, or wheezing.  Cardiovascular:  Regular rate and rhythm without murmur. Carotid pulses are intact and equal bilaterally.  Extremities: no clubbing, cyanosis, or edema.    Medical decision-making and discussion:  1. Type 2 diabetes mellitus without complication, without long-term current use of insulin (HCC)  Discussed diet in detail today.  Encourage patient to begin taking prescribed dose of Victoza 1.8 mg/day.  - POCT  A1C  - metformin (GLUCOPHAGE) 1000 MG tablet; Take 1 Tab by mouth 2 times a day, with meals.  Dispense: 180 Tab; Refill: 1  - lisinopril (PRINIVIL) 10 MG Tab; Take 1 Tab by mouth every day.  Dispense: 90 Tab; Refill: 1  - pravastatin (PRAVACHOL) 20 MG Tab; Take 1 Tab by mouth every bedtime.  Dispense: 90 Tab; Refill: 1    2. Acute on chronic combined systolic and diastolic congestive heart failure (HCC)    - metoprolol SR (TOPROL XL) 25 MG TABLET SR 24 HR; Take 1 Tab by mouth every day.  Dispense: 90 Tab; Refill: 1  - lisinopril (PRINIVIL) 10 MG Tab; Take 1 Tab by mouth every day.  Dispense: 90 Tab; Refill: 1    3. Restless leg syndrome, controlled    - ROPINIRole (REQUIP) 1 MG Tab; Take 1-3 Tabs by mouth every bedtime.  Dispense: 180 Tab; Refill: 0      Please note that this dictation was created using voice recognition software. I have made every reasonable attempt to correct obvious errors, but I expect that there are errors of grammar and possibly content that I did not discover before finalizing the note.      Return in about 3 months (around 10/10/2019) for diabetes, .

## 2019-07-11 NOTE — ASSESSMENT & PLAN NOTE
This patient states he has been using 3-4 1.0 mg ropinirole tablets every evening to alleviate his restless legs. He states this works for him. He is requesting an refill on this medication today.

## 2019-07-12 ENCOUNTER — TELEPHONE (OUTPATIENT)
Dept: MEDICAL GROUP | Facility: CLINIC | Age: 64
End: 2019-07-12

## 2019-07-12 NOTE — TELEPHONE ENCOUNTER
DOCUMENTATION OF PAR STATUS:    1. Name of Medication & Dose: Metoprolol 25MG     2. Name of Prescription Coverage Company & phone #: Medicaid    3. Date Prior Auth Submitted: 7/12/19    4. What information was given to obtain insurance decision? All information on heart failure    5. Prior Auth Status? Pending    6. Patient Notified: no

## 2019-07-17 ENCOUNTER — TELEPHONE (OUTPATIENT)
Dept: MEDICAL GROUP | Facility: CLINIC | Age: 64
End: 2019-07-17

## 2019-07-17 NOTE — TELEPHONE ENCOUNTER
metoprolol refused by insurance likely due to extended release formulation. Regular release was sent today.

## 2019-09-03 DIAGNOSIS — G25.81 RESTLESS LEG SYNDROME, CONTROLLED: ICD-10-CM

## 2019-09-03 RX ORDER — ROPINIROLE 1 MG/1
TABLET, FILM COATED ORAL
Qty: 180 TAB | Refills: 0 | Status: SHIPPED | OUTPATIENT
Start: 2019-09-03 | End: 2019-12-03 | Stop reason: SDUPTHER

## 2019-09-03 NOTE — TELEPHONE ENCOUNTER
Was the patient seen in the last year in this department? Yes    Does patient have an active prescription for medications requested? Yes    Received Request Via: Pharmacy    Office Visit on 07/10/2019   Component Date Value   • Glycohemoglobin 07/10/2019 7.8*   • Internal Control Negative 07/10/2019 Valid    • Internal Control Positive 07/10/2019 Valid    Office Visit on 02/07/2019   Component Date Value   • Glycohemoglobin 02/07/2019 7.0    • Internal Control Negative 02/07/2019 Negative    • Internal Control Positive 02/07/2019 Positive    Office Visit on 12/20/2018   Component Date Value   • Glycohemoglobin 12/20/2018 6.9    • Internal Control Negative 12/20/2018 Negative    • Internal Control Positive 12/20/2018 Positive    Hospital Outpatient Visit on 12/18/2018   Component Date Value   • Sodium 12/18/2018 140    • Potassium 12/18/2018 4.8    • Chloride 12/18/2018 102    • Co2 12/18/2018 30    • Anion Gap 12/18/2018 8.0    • Glucose 12/18/2018 75    • Bun 12/18/2018 13    • Creatinine 12/18/2018 0.97    • Calcium 12/18/2018 9.9    • AST(SGOT) 12/18/2018 25    • ALT(SGPT) 12/18/2018 23    • Alkaline Phosphatase 12/18/2018 77    • Total Bilirubin 12/18/2018 0.5    • Albumin 12/18/2018 4.6    • Total Protein 12/18/2018 7.7    • Globulin 12/18/2018 3.1    • A-G Ratio 12/18/2018 1.5    • Cholesterol,Tot 12/18/2018 156    • Triglycerides 12/18/2018 118    • HDL 12/18/2018 37*   • LDL 12/18/2018 95    • GFR If  12/18/2018 >60    • GFR If Non  Ameri* 12/18/2018 >60    • Miscellaneous Lab Result 12/18/2018 SEE NOTE*   ]

## 2019-09-13 ENCOUNTER — TELEPHONE (OUTPATIENT)
Dept: MEDICAL GROUP | Facility: CLINIC | Age: 64
End: 2019-09-13

## 2019-09-13 NOTE — TELEPHONE ENCOUNTER
MEDICATION PRIOR AUTHORIZATION:    1. Name of Medication: Semaglutide 0.25 or 0.5 MG/DOSE Solution Pen-injector         2. Requested By (Name of Pharmacy):      3. Is insurance on file current? yes    4. What is the name & phone number of the 3rd party payor?   Cover[employer]eds  Key: [id number]  Pt Last Name: JESSENIA  : 1955

## 2020-01-08 ENCOUNTER — OFFICE VISIT (OUTPATIENT)
Dept: MEDICAL GROUP | Facility: CLINIC | Age: 65
End: 2020-01-08
Payer: MEDICAID

## 2020-01-08 VITALS
OXYGEN SATURATION: 90 % | BODY MASS INDEX: 31.69 KG/M2 | TEMPERATURE: 97.9 F | DIASTOLIC BLOOD PRESSURE: 98 MMHG | HEART RATE: 89 BPM | HEIGHT: 72 IN | SYSTOLIC BLOOD PRESSURE: 172 MMHG | WEIGHT: 234 LBS

## 2020-01-08 DIAGNOSIS — E11.9 TYPE 2 DIABETES MELLITUS WITHOUT COMPLICATION, WITHOUT LONG-TERM CURRENT USE OF INSULIN (HCC): ICD-10-CM

## 2020-01-08 DIAGNOSIS — L03.119 CELLULITIS AND ABSCESS OF FOOT: ICD-10-CM

## 2020-01-08 DIAGNOSIS — L02.619 CELLULITIS AND ABSCESS OF FOOT: ICD-10-CM

## 2020-01-08 PROCEDURE — 99213 OFFICE O/P EST LOW 20 MIN: CPT | Performed by: PHYSICIAN ASSISTANT

## 2020-01-08 RX ORDER — NICOTINE 14MG/24HR
2 PATCH, TRANSDERMAL 24 HOURS TRANSDERMAL 2 TIMES DAILY
Qty: 35 CAP | Refills: 0 | Status: SHIPPED
Start: 2020-01-08 | End: 2020-01-13

## 2020-01-08 RX ORDER — SULFAMETHOXAZOLE AND TRIMETHOPRIM 800; 160 MG/1; MG/1
1 TABLET ORAL 2 TIMES DAILY
Qty: 20 TAB | Refills: 0 | Status: SHIPPED
Start: 2020-01-08 | End: 2020-01-13

## 2020-01-08 NOTE — ASSESSMENT & PLAN NOTE
This patient of is unsure of when he stepped on a foreign body but has developed an abscess around a foreign body in his left foot over the past 3 days.  He has tried soaking it in Epsom salts and has been able to express some fluid from this but states that over the past 24 hours his foot has become increasingly swollen, red, hot and somewhat tender.

## 2020-01-08 NOTE — PROGRESS NOTES
Chief Complaint   Patient presents with   • Foot Swelling     left       HISTORY OF PRESENT ILLNESS: Patient is a 64 y.o. male established patient who presents today for evaluation and management of:    Cellulitis and abscess of foot  This patient of is unsure of when he stepped on a foreign body but has developed an abscess around a foreign body in his left foot over the past 3 days.  He has tried soaking it in Epsom salts and has been able to express some fluid from this but states that over the past 24 hours his foot has become increasingly swollen, red, hot and somewhat tender.    Type 2 diabetes mellitus without complication, without long-term current use of insulin ([facility])  This diagnosis will complicate this patient's current abscess and cellulitis of his left foot.       Patient Active Problem List    Diagnosis Date Noted   • Cellulitis and abscess of foot 01/08/2020   • Shoulder injury, left, sequela 04/04/2018   • History of colonoscopy with polypectomy 04/04/2018   • Restless leg syndrome, controlled 04/04/2018   • Obesity (BMI 30-39.9) 10/18/2017   • Type 2 diabetes mellitus without complication, without long-term current use of insulin ([facility]) 10/18/2017   • Acute on chronic combined systolic and diastolic congestive heart failure ([facility]) 10/18/2017   • COPD with acute exacerbation ([facility]) 10/18/2017   • Left ventricular ejection fraction less than 40% 10/18/2017       Allergies:Patient has no known allergies.    Current Outpatient Medications   Medication Sig Dispense Refill   • sulfamethoxazole-trimethoprim (BACTRIM DS) 800-160 MG tablet Take 1 Tab by mouth 2 times a day for 10 days. With a probiotic 1 hour after each dose 20 Tab 0   • Saccharomyces boulardii (PROBIOTIC) 250 MG Cap Take 2 Capsule by mouth 2 Times a Day. 1 hour after antibiotic and for 1 week after antibiotic is complete 35 Cap 0   • metformin (GLUCOPHAGE) 1000 MG tablet TAKE ONE TABLET BY MOUTH TWO TIMES A DAY WITH MEALS 180 Tab 0   •  potassium chloride SA (KDUR) 20 MEQ Tab CR TAKE ONE TABLET BY MOUTH TWO TIMES A  Tab 0   • ROPINIRole (REQUIP) 1 MG Tab TAKE 1 TO 3 TABLETS BY MOUTH EVERY DAY AT BEDTIME 180 Tab 0   • furosemide (LASIX) 40 MG Tab TAKE ONE TABLET BY MOUTH EVERY DAY 90 Tab 0   • metoprolol (LOPRESSOR) 25 MG Tab Take 1 Tab by mouth 2 times a day. 180 Tab 0   • lisinopril (PRINIVIL) 10 MG Tab Take 1 Tab by mouth every day. 90 Tab 1   • pravastatin (PRAVACHOL) 20 MG Tab Take 1 Tab by mouth every bedtime. 90 Tab 1   • liraglutide (VICTOZA) 18 MG/3ML Solution Pen-injector injection Inject 0.3 mL as instructed every day. PLEASE HAVE PATIENT RETURN TO CLINIC FOR INSTRUCTIONS ON USE PRIOR TO STARTING MEDICATION 27 mL 3   • Insulin Pen Needle (PEN NEEDLES) 32G X 4 MM Misc 1 Each by Does not apply route every day. 18 Each 3   • albuterol 108 (90 Base) MCG/ACT Aero Soln inhalation aerosol Inhale 2 Puffs by mouth 2 times a day as needed for Shortness of Breath. 17 g 5   • potassium chloride SA (KDUR) 20 MEQ Tab CR Take 1 Tab by mouth 2 Times a Day. 180 Tab 1   • glucose blood strip 1 Strip by Other route every day. 100 Strip 1   • aspirin (ASA) 81 MG Chew Tab chewable tablet Take 1 Tab by mouth every day. 200 Tab 1     No current facility-administered medications for this visit.        Social History     Tobacco Use   • Smoking status: Former Smoker     Packs/day: 0.75     Years: 45.00     Pack years: 33.75     Types: Cigarettes     Last attempt to quit: 2/10/2019     Years since quittin.9   • Smokeless tobacco: Never Used   • Tobacco comment: 4-5 cigarettes/day    Substance Use Topics   • Alcohol use: Yes     Alcohol/week: 1.2 oz     Types: 2 Cans of beer per week   • Drug use: No       Family Status   Relation Name Status   • Mo     • Fa     History reviewed. No pertinent family history.    Review of Systems: See HPI above.   Constitutional: Negative for fever, chills, weight loss and malaise.   Respiratory: Negative  for shortness of breath, cough, sputum production and wheezing.    Cardiovascular: Negative for chest pain  Musculoskeletal: Negative for myalgias, back pain and joint pain.   Skin: see above    Exam:  BP (!) 172/98 (BP Location: Left arm, Patient Position: Sitting, BP Cuff Size: Adult)   Pulse 89   Temp 36.6 °C (97.9 °F) (Temporal)   Ht 1.829 m (6')   Wt 106.1 kg (234 lb)   SpO2 90%   Body mass index is 31.74 kg/m².  General:  Obese male in NAD  Head: is grossly normal.  Neck: Supple without masses. Thyroid is not visibly enlarged.  Pulmonary: Normal effort.   Cardiovascular: Carotid pulses are intact and equal bilaterally.  Extremities: no clubbing, cyanosis  Skin: 0.25 cm wide complex abscess with opening expressed less than 1cc chunky grey-white and serosanguinous fluid with surrounding edema, deep red erythema, heat and only mild tenderness to touch.     Medical decision-making and discussion:  1. Cellulitis and abscess of foot  Expressed the infected materials that were able to be expressed. Cleaned with alcohol wipes and covered with polysporin, gauze and tegaderm. Provided with materials to continue covering this at home. Advised to leave the bandage on as long as it will stay on or to replace after showers. Advised to elevate and use ice for pain. Advised to go to ER if worse in the next 48 hours and to return in 5 days for recheck.   - sulfamethoxazole-trimethoprim (BACTRIM DS) 800-160 MG tablet; Take 1 Tab by mouth 2 times a day for 10 days. With a probiotic 1 hour after each dose  Dispense: 20 Tab; Refill: 0  - Saccharomyces boulardii (PROBIOTIC) 250 MG Cap; Take 2 Capsule by mouth 2 Times a Day. 1 hour after antibiotic and for 1 week after antibiotic is complete  Dispense: 35 Cap; Refill: 0    2. Type 2 diabetes mellitus without complication, without long-term current use of insulin (HCC)  Neuropathy will be explored at next visit for this.       Please note that this dictation was created using  voice recognition software. I have made every reasonable attempt to correct obvious errors, but I expect that there are errors of grammar and possibly content that I did not discover before finalizing the note.      Return in about 5 days (around 1/13/2020) for infection recheck.

## 2020-01-13 ENCOUNTER — OFFICE VISIT (OUTPATIENT)
Dept: MEDICAL GROUP | Facility: CLINIC | Age: 65
End: 2020-01-13
Payer: MEDICAID

## 2020-01-13 VITALS
TEMPERATURE: 96.6 F | OXYGEN SATURATION: 92 % | BODY MASS INDEX: 31.29 KG/M2 | WEIGHT: 231 LBS | HEART RATE: 83 BPM | DIASTOLIC BLOOD PRESSURE: 78 MMHG | RESPIRATION RATE: 20 BRPM | HEIGHT: 72 IN | SYSTOLIC BLOOD PRESSURE: 150 MMHG

## 2020-01-13 DIAGNOSIS — I50.43 ACUTE ON CHRONIC COMBINED SYSTOLIC AND DIASTOLIC CONGESTIVE HEART FAILURE (HCC): ICD-10-CM

## 2020-01-13 DIAGNOSIS — E11.9 TYPE 2 DIABETES MELLITUS WITHOUT COMPLICATION, WITHOUT LONG-TERM CURRENT USE OF INSULIN (HCC): ICD-10-CM

## 2020-01-13 DIAGNOSIS — I83.893 VARICOSE VEINS OF BILATERAL LOWER EXTREMITIES WITH OTHER COMPLICATIONS: ICD-10-CM

## 2020-01-13 DIAGNOSIS — E66.9 OBESITY (BMI 30-39.9): ICD-10-CM

## 2020-01-13 PROBLEM — L02.619 CELLULITIS AND ABSCESS OF FOOT: Status: RESOLVED | Noted: 2020-01-08 | Resolved: 2020-01-13

## 2020-01-13 PROBLEM — L03.119 CELLULITIS AND ABSCESS OF FOOT: Status: RESOLVED | Noted: 2020-01-08 | Resolved: 2020-01-13

## 2020-01-13 LAB
HBA1C MFR BLD: 7.3 % (ref 0–5.6)
INT CON NEG: NEGATIVE
INT CON POS: POSITIVE

## 2020-01-13 PROCEDURE — 83036 HEMOGLOBIN GLYCOSYLATED A1C: CPT | Performed by: PHYSICIAN ASSISTANT

## 2020-01-13 PROCEDURE — 99214 OFFICE O/P EST MOD 30 MIN: CPT | Performed by: PHYSICIAN ASSISTANT

## 2020-01-13 RX ORDER — PRAVASTATIN SODIUM 20 MG
20 TABLET ORAL
Qty: 90 TAB | Refills: 1 | Status: SHIPPED | OUTPATIENT
Start: 2020-01-13 | End: 2020-03-11 | Stop reason: SDUPTHER

## 2020-01-13 RX ORDER — LISINOPRIL 10 MG/1
10 TABLET ORAL
Qty: 90 TAB | Refills: 1 | Status: SHIPPED | OUTPATIENT
Start: 2020-01-13 | End: 2020-03-11 | Stop reason: SDUPTHER

## 2020-01-13 RX ORDER — PEN NEEDLE, DIABETIC 30 GX3/16"
1 NEEDLE, DISPOSABLE MISCELLANEOUS DAILY
Qty: 18 EACH | Refills: 3 | Status: SHIPPED | OUTPATIENT
Start: 2020-01-13

## 2020-01-13 RX ORDER — FUROSEMIDE 40 MG/1
40 TABLET ORAL
Qty: 90 TAB | Refills: 0 | Status: SHIPPED | OUTPATIENT
Start: 2020-01-13 | End: 2020-03-11 | Stop reason: SDUPTHER

## 2020-01-13 NOTE — ASSESSMENT & PLAN NOTE
This is a chronic problem, potentially the cause of this patient's restless leg syndrome which has recently gotten much worse. He does have a recent foreign body in his left foot with cellulitis and infection due to worsening neuropathy related to this problem and diabetes. He states his feet are almost always a slight tint of blue.

## 2020-01-13 NOTE — ASSESSMENT & PLAN NOTE
diagnosed in [facility] in 2017. He is well-controlled at this time.  He does have a cardiologist at this time and continues taking metoprolol, and furosemide as well as lisinopril for hypertension. He is requesting medication refills today. He has noticed some increasing shortness of breath with moderate movement and without cough.

## 2020-01-13 NOTE — ASSESSMENT & PLAN NOTE
.Last A1c: 7.3% 1/13/20,  7.8% 7/11/19, 7.0% 02/07/19  DM Medications: metformin 1000 mg twice per day and victoza 1.8mg nightly.  Patient accidentally may have restarted glipizide but can't remember today. Patient reports good medication compliance.   HTN: Blood pressure goal <140/<90 No  ACE: lisinopril 10 mg tablet every morning  Hyperlipidemia: Cholesterol goal LDL <100 yes   Currently Rx Statin: Pravastatin 20 mg every evening.  Diabetic diet: No, poor diet for at least 6 months.   Exercise: walking occasionally  Last monofilament foot exam: reduced sensation in forefoot, cyanosis, callous on left second toe. Checks feet at home: Not recently, no sores currently   Last Eye exam: none recently   Kidney function: GFR >60 12 /18  Microalbumin screening: wnl 12/18  Has patient received flu vaccine: Yes  Has patient received Hep B series:Yes    A1c goal <7 no  Current barriers to control include poor diet.   Glucose monitoring frequency: rarely  Fasting sugars:   Hypoglycemic episodes none recently   Diabetic complications: cardiovascular disease, neuropathy,     The patient is taking ASA every day and is not taking all other medications as prescribed. . Patient denies any side effects of medication.

## 2020-01-13 NOTE — PROGRESS NOTES
Chief Complaint   Patient presents with   • Foot Problem   • Diabetes Mellitus   • Shortness of Breath       HISTORY OF PRESENT ILLNESS: Patient is a 64 y.o. male established patient who presents today for evaluation and management of:    Varicose veins of bilateral lower extremities with other complications  This is a chronic problem, potentially the cause of this patient's restless leg syndrome which has recently gotten much worse. He does have a recent foreign body in his left foot with cellulitis and infection due to worsening neuropathy related to this problem and diabetes. He states his feet are almost always a slight tint of blue.     Type 2 diabetes mellitus without complication, without long-term current use of insulin ([facility])  .Last A1c: 7.3% 1/13/20,  7.8% 7/11/19, 7.0% 02/07/19  DM Medications: metformin 1000 mg twice per day and victoza 1.8mg nightly.  Patient accidentally may have restarted glipizide but can't remember today. Patient reports good medication compliance.   HTN: Blood pressure goal <140/<90 No  ACE: lisinopril 10 mg tablet every morning  Hyperlipidemia: Cholesterol goal LDL <100 yes   Currently Rx Statin: Pravastatin 20 mg every evening.  Diabetic diet: No, poor diet for at least 6 months.   Exercise: walking occasionally  Last monofilament foot exam: reduced sensation in forefoot, cyanosis, callous on left second toe. Checks feet at home: Not recently, no sores currently   Last Eye exam: none recently   Kidney function: GFR >60 12 /18  Microalbumin screening: wnl 12/18  Has patient received flu vaccine: Yes  Has patient received Hep B series:Yes    A1c goal <7 no  Current barriers to control include poor diet.   Glucose monitoring frequency: rarely  Fasting sugars:   Hypoglycemic episodes none recently   Diabetic complications: cardiovascular disease, neuropathy,     The patient is taking ASA every day and is not taking all other medications as prescribed. . Patient denies any side  effects of medication.      Acute on chronic combined systolic and diastolic congestive heart failure (CMS-HCC) (HCC)  diagnosed in [facility] in 2017. He is well-controlled at this time.  He does have a cardiologist at this time and continues taking metoprolol, and furosemide as well as lisinopril for hypertension. He is requesting medication refills today. He has noticed some increasing shortness of breath with moderate movement and without cough.        Patient Active Problem List    Diagnosis Date Noted   • Varicose veins of bilateral lower extremities with other complications 01/13/2020   • Diabetes mellitus ([facility]) 01/13/2020   • Shoulder injury, left, sequela 04/04/2018   • History of colonoscopy with polypectomy 04/04/2018   • Restless leg syndrome, controlled 04/04/2018   • Obesity (BMI 30-39.9) 10/18/2017   • Type 2 diabetes mellitus without complication, without long-term current use of insulin ([facility]) 10/18/2017   • Acute on chronic combined systolic and diastolic congestive heart failure ([facility]) 10/18/2017   • COPD with acute exacerbation ([facility]) 10/18/2017   • Left ventricular ejection fraction less than 40% 10/18/2017       Allergies:Patient has no known allergies.    Current Outpatient Medications   Medication Sig Dispense Refill   • lisinopril (PRINIVIL) 10 MG Tab Take 1 Tab by mouth every day. 90 Tab 1   • metoprolol (LOPRESSOR) 25 MG Tab Take 1 Tab by mouth 2 times a day. 180 Tab 0   • furosemide (LASIX) 40 MG Tab Take 1 Tab by mouth every day. 90 Tab 0   • pravastatin (PRAVACHOL) 20 MG Tab Take 1 Tab by mouth every bedtime. 90 Tab 1   • metformin (GLUCOPHAGE) 1000 MG tablet Take 1 Tab by mouth 2 times a day, with meals. 180 Tab 0   • Insulin Pen Needle (PEN NEEDLES) 32G X 4 MM Misc 1 Each by Does not apply route every day. 18 Each 3   • ROPINIRole (REQUIP) 1 MG Tab TAKE 1 TO 3 TABLETS BY MOUTH EVERY DAY AT BEDTIME 180 Tab 0   • liraglutide (VICTOZA) 18 MG/3ML Solution Pen-injector injection Inject 0.3  mL as instructed every day. PLEASE HAVE PATIENT RETURN TO CLINIC FOR INSTRUCTIONS ON USE PRIOR TO STARTING MEDICATION 27 mL 3   • albuterol 108 (90 Base) MCG/ACT Aero Soln inhalation aerosol Inhale 2 Puffs by mouth 2 times a day as needed for Shortness of Breath. 17 g 5   • potassium chloride SA (KDUR) 20 MEQ Tab CR Take 1 Tab by mouth 2 Times a Day. 180 Tab 1   • glucose blood strip 1 Strip by Other route every day. 100 Strip 1   • aspirin (ASA) 81 MG Chew Tab chewable tablet Take 1 Tab by mouth every day. 200 Tab 1     No current facility-administered medications for this visit.        Social History     Tobacco Use   • Smoking status: Former Smoker     Packs/day: 0.75     Years: 45.00     Pack years: 33.75     Types: Cigarettes     Last attempt to quit: 2/10/2019     Years since quittin.9   • Smokeless tobacco: Never Used   • Tobacco comment: 4-5 cigarettes/day    Substance Use Topics   • Alcohol use: Yes     Alcohol/week: 1.2 oz     Types: 2 Cans of beer per week   • Drug use: No       Family Status   Relation Name Status   • Mo     • Fa     History reviewed. No pertinent family history.    Review of Systems: See HPI above.   Constitutional: Negative for fever, chills, weight loss and malaise.   HENT: Negative for ear pain, nosebleeds, congestion, sore throat and neck pain.    Eyes: Negative for blurred vision.   Respiratory: Negative for cough, sputum production and wheezing.  positive for increased frequency of shortness of breath on moderate exertion.   Cardiovascular: Negative for chest pain, palpitations, orthopnea and leg swelling.   Gastrointestinal: Negative for heartburn, nausea, vomiting and abdominal pain.   Genitourinary: Negative for dysuria, urgency and frequency.   Musculoskeletal: Negative for myalgias, back pain and joint pain.   Skin: Negative for rash and itching.   Neurological: Negative for dizziness, tingling, tremors, focal weakness and headaches. Positive for reduced  sensation in toes.   Endo/Heme/Allergies: Does not bruise/bleed easily.   Psychiatric/Behavioral: Negative for depression, suicidal ideas and memory loss.  The patient is not nervous/anxious and does not have insomnia.      Exam:  /78 (BP Location: Left arm, Patient Position: Sitting, BP Cuff Size: Adult)   Pulse 83   Temp 35.9 °C (96.6 °F) (Temporal)   Resp 20   Ht 1.829 m (6')   Wt 104.8 kg (231 lb)   SpO2 92%   Body mass index is 31.33 kg/m².  General:  Obese male in NAD  Head: is grossly normal.  Neck: Supple without masses. Thyroid is not visibly enlarged.  Pulmonary: Clear to ausculation. Normal effort. No rales, ronchi, or wheezing.  Cardiovascular: Regular rate and rhythm without murmur. Carotid pulses are intact and equal bilaterally.  Extremities: no clubbing,  or edema. Feet are cyanotic.     Medical decision-making and discussion:  1. Varicose veins of bilateral lower extremities with other complications    - REFERRAL TO VASCULAR SURGERY    2. Acute on chronic combined systolic and diastolic congestive heart failure (HCC)    - lisinopril (PRINIVIL) 10 MG Tab; Take 1 Tab by mouth every day.  Dispense: 90 Tab; Refill: 1  - metoprolol (LOPRESSOR) 25 MG Tab; Take 1 Tab by mouth 2 times a day.  Dispense: 180 Tab; Refill: 0  - furosemide (LASIX) 40 MG Tab; Take 1 Tab by mouth every day.  Dispense: 90 Tab; Refill: 0  - Insulin Pen Needle (PEN NEEDLES) 32G X 4 MM Misc; 1 Each by Does not apply route every day.  Dispense: 18 Each; Refill: 3    3. Type 2 diabetes mellitus without complication, without long-term current use of insulin (HCC)  Discussed diet improvements and possibly needing to start another new medication due to poor contorl. Will recheck after diet changes are made in 6 weeks.   - POCT  A1C  - lisinopril (PRINIVIL) 10 MG Tab; Take 1 Tab by mouth every day.  Dispense: 90 Tab; Refill: 1  - pravastatin (PRAVACHOL) 20 MG Tab; Take 1 Tab by mouth every bedtime.  Dispense: 90 Tab; Refill:  1  - metformin (GLUCOPHAGE) 1000 MG tablet; Take 1 Tab by mouth 2 times a day, with meals.  Dispense: 180 Tab; Refill: 0  - Insulin Pen Needle (PEN NEEDLES) 32G X 4 MM Misc; 1 Each by Does not apply route every day.  Dispense: 18 Each; Refill: 3    4. Obesity (BMI 30-39.9)    - Patient identified as having weight management issue.  Appropriate orders and counseling given.      Please note that this dictation was created using voice recognition software. I have made every reasonable attempt to correct obvious errors, but I expect that there are errors of grammar and possibly content that I did not discover before finalizing the note.      Return in about 1 week (around 1/20/2020) for medication overview, and 6 week appointment diabetes recheck.

## 2020-03-11 ENCOUNTER — OFFICE VISIT (OUTPATIENT)
Dept: MEDICAL GROUP | Facility: CLINIC | Age: 65
End: 2020-03-11
Payer: MEDICAID

## 2020-03-11 ENCOUNTER — HOSPITAL ENCOUNTER (OUTPATIENT)
Facility: MEDICAL CENTER | Age: 65
End: 2020-03-11
Attending: PHYSICIAN ASSISTANT
Payer: MEDICAID

## 2020-03-11 VITALS
RESPIRATION RATE: 16 BRPM | SYSTOLIC BLOOD PRESSURE: 132 MMHG | WEIGHT: 228 LBS | OXYGEN SATURATION: 96 % | HEART RATE: 86 BPM | HEIGHT: 72 IN | TEMPERATURE: 98.2 F | BODY MASS INDEX: 30.88 KG/M2 | DIASTOLIC BLOOD PRESSURE: 80 MMHG

## 2020-03-11 DIAGNOSIS — E11.59 TYPE 2 DIABETES MELLITUS WITH OTHER CIRCULATORY COMPLICATION, WITHOUT LONG-TERM CURRENT USE OF INSULIN (HCC): ICD-10-CM

## 2020-03-11 DIAGNOSIS — G25.81 RESTLESS LEG SYNDROME, CONTROLLED: ICD-10-CM

## 2020-03-11 DIAGNOSIS — E66.9 OBESITY (BMI 30-39.9): ICD-10-CM

## 2020-03-11 DIAGNOSIS — E11.9 TYPE 2 DIABETES MELLITUS WITHOUT COMPLICATION, WITHOUT LONG-TERM CURRENT USE OF INSULIN (HCC): ICD-10-CM

## 2020-03-11 DIAGNOSIS — I50.43 ACUTE ON CHRONIC COMBINED SYSTOLIC AND DIASTOLIC CONGESTIVE HEART FAILURE (HCC): ICD-10-CM

## 2020-03-11 DIAGNOSIS — J44.1 COPD WITH ACUTE EXACERBATION (HCC): ICD-10-CM

## 2020-03-11 LAB
HBA1C MFR BLD: 7.5 % (ref 0–5.6)
INT CON NEG: ABNORMAL
INT CON POS: ABNORMAL

## 2020-03-11 PROCEDURE — 83036 HEMOGLOBIN GLYCOSYLATED A1C: CPT | Mod: QW | Performed by: PHYSICIAN ASSISTANT

## 2020-03-11 PROCEDURE — 82570 ASSAY OF URINE CREATININE: CPT

## 2020-03-11 PROCEDURE — 99214 OFFICE O/P EST MOD 30 MIN: CPT | Performed by: PHYSICIAN ASSISTANT

## 2020-03-11 PROCEDURE — 82043 UR ALBUMIN QUANTITATIVE: CPT

## 2020-03-11 RX ORDER — PRAVASTATIN SODIUM 20 MG
20 TABLET ORAL
Qty: 90 TAB | Refills: 1 | Status: SHIPPED | OUTPATIENT
Start: 2020-03-11 | End: 2020-06-25 | Stop reason: SDUPTHER

## 2020-03-11 RX ORDER — ALBUTEROL SULFATE 90 UG/1
2 AEROSOL, METERED RESPIRATORY (INHALATION) 2 TIMES DAILY PRN
Qty: 17 G | Refills: 5 | Status: SHIPPED | OUTPATIENT
Start: 2020-03-11 | End: 2020-07-23 | Stop reason: SDUPTHER

## 2020-03-11 RX ORDER — FUROSEMIDE 40 MG/1
40 TABLET ORAL
Qty: 90 TAB | Refills: 1 | Status: SHIPPED | OUTPATIENT
Start: 2020-03-11 | End: 2020-07-23 | Stop reason: SDUPTHER

## 2020-03-11 RX ORDER — ASPIRIN 81 MG/1
81 TABLET, CHEWABLE ORAL DAILY
Qty: 200 TAB | Refills: 1 | Status: SHIPPED | OUTPATIENT
Start: 2020-03-11 | End: 2020-07-23 | Stop reason: SDUPTHER

## 2020-03-11 RX ORDER — LISINOPRIL 10 MG/1
10 TABLET ORAL
Qty: 90 TAB | Refills: 1 | Status: SHIPPED | OUTPATIENT
Start: 2020-03-11 | End: 2020-06-25 | Stop reason: SDUPTHER

## 2020-03-11 RX ORDER — ROPINIROLE 1 MG/1
3 TABLET, FILM COATED ORAL
Qty: 270 TAB | Refills: 1 | Status: SHIPPED | OUTPATIENT
Start: 2020-03-11 | End: 2020-07-23

## 2020-03-11 ASSESSMENT — PATIENT HEALTH QUESTIONNAIRE - PHQ9: CLINICAL INTERPRETATION OF PHQ2 SCORE: 0

## 2020-03-11 NOTE — ASSESSMENT & PLAN NOTE
This patient states he has been using three 1.0 mg ropinirole tablets every evening to alleviate his restless legs. He states this works for him. He is requesting an refill on this medication today.

## 2020-03-11 NOTE — PROGRESS NOTES
Chief Complaint   Patient presents with   • Shortness of Breath       HISTORY OF PRESENT ILLNESS: Patient is a 64 y.o. male established patient who presents today for evaluation and management of:    Type 2 diabetes mellitus without complication, without long-term current use of insulin (HCC)  .Last A1c: 7.5% 3/11/20, 7.3% 1/13/20,  7.8% 7/11/19, 7.0% 02/07/19  DM Medications: metformin 1000 mg twice per day and victoza 1.8mg every other night. Patient reports poor medication compliance.   HTN: Blood pressure goal <140/<90 No  ACE: lisinopril 10 mg tablet every morning  Hyperlipidemia: Cholesterol goal LDL <100 yes   Currently Rx Statin: Pravastatin 20 mg every evening.  Diabetic diet: No, poor diet except has cut out bread.   Exercise: walking occasionally  Last monofilament foot exam: reduced sensation in forefoot, cyanosis, callous on left second toe. Checks feet at home: Not recently, no sores currently   Last Eye exam: none recently   Kidney function: GFR >60 12 /18  Microalbumin screening: wnl 12/18  Has patient received flu vaccine: Yes  Has patient received Hep B series:Yes    A1c goal <7 no  Current barriers to control include poor diet.   Glucose monitoring frequency: rarely  Fasting sugars:   Hypoglycemic episodes none recently   Diabetic complications: cardiovascular disease, neuropathy,     The patient is taking ASA every day and is not taking all other medications as prescribed. . Patient denies any side effects of medication.      Restless leg syndrome, controlled  This patient states he has been using three 1.0 mg ropinirole tablets every evening to alleviate his restless legs. He states this works for him. He is requesting an refill on this medication today.    Obesity (BMI 30-39.9)  This patient has lost approximately 6 pounds over the past 2 months due to improved exercise and slightly improved diet. He remains dependent on donations for food so his food quality is not always superb but is  working hard at improving this.    COPD with acute exacerbation (CMS-HCC) ([facility])  Patient smoked for about 40 years. He now is only using albuterol on a rescue basis but with recent changes in allergens in the air, he feels this is getting more frequent and worse.     Acute on chronic combined systolic and diastolic congestive heart failure (CMS-HCC) ([facility])  Well-controlled. Requests refills. Does not have leg swelling or palpitations.        Patient Active Problem List    Diagnosis Date Noted   • Varicose veins of bilateral lower extremities with other complications 01/13/2020   • Shoulder injury, left, sequela 04/04/2018   • History of colonoscopy with polypectomy 04/04/2018   • Restless leg syndrome, controlled 04/04/2018   • Obesity (BMI 30-39.9) 10/18/2017   • Type 2 diabetes mellitus without complication, without long-term current use of insulin ([facility]) 10/18/2017   • Acute on chronic combined systolic and diastolic congestive heart failure ([facility]) 10/18/2017   • COPD with acute exacerbation ([facility]) 10/18/2017   • Left ventricular ejection fraction less than 40% 10/18/2017       Allergies:Patient has no known allergies.    Current Outpatient Medications   Medication Sig Dispense Refill   • ipratropium-albuterol (COMBIVENT RESPIMAT)  MCG/ACT Aero Soln Inhale 1 Puff by mouth 4 times a day. 3 Inhaler 3   • aspirin (ASA) 81 MG Chew Tab chewable tablet Take 1 Tab by mouth every day. 200 Tab 1   • furosemide (LASIX) 40 MG Tab Take 1 Tab by mouth every day. 90 Tab 1   • metformin (GLUCOPHAGE) 1000 MG tablet Take 1 Tab by mouth 2 times a day, with meals. 180 Tab 3   • albuterol 108 (90 Base) MCG/ACT Aero Soln inhalation aerosol Inhale 2 Puffs by mouth 2 times a day as needed for Shortness of Breath. 17 g 5   • Semaglutide, 1 MG/DOSE, 2 MG/1.5ML Solution Pen-injector Inject 1 mg as instructed every 7 days. 6 PEN 3   • metoprolol (LOPRESSOR) 25 MG Tab Take 1 Tab by mouth 2 times a day. 180 Tab 3   • pravastatin  (PRAVACHOL) 20 MG Tab Take 1 Tab by mouth every bedtime. 90 Tab 1   • lisinopril (PRINIVIL) 10 MG Tab Take 1 Tab by mouth every day. 90 Tab 1   • ROPINIRole (REQUIP) 1 MG Tab Take 3 Tabs by mouth every bedtime. 270 Tab 01   • Insulin Pen Needle (PEN NEEDLES) 32G X 4 MM Misc 1 Each by Does not apply route every day. 18 Each 3   • potassium chloride SA (KDUR) 20 MEQ Tab CR Take 1 Tab by mouth 2 Times a Day. 180 Tab 1   • glucose blood strip 1 Strip by Other route every day. 100 Strip 1     No current facility-administered medications for this visit.        Social History     Tobacco Use   • Smoking status: Former Smoker     Packs/day: 0.75     Years: 45.00     Pack years: 33.75     Types: Cigarettes     Last attempt to quit: 2/10/2019     Years since quittin.0   • Smokeless tobacco: Never Used   • Tobacco comment: 4-5 cigarettes/day    Substance Use Topics   • Alcohol use: Yes     Alcohol/week: 1.2 oz     Types: 2 Cans of beer per week   • Drug use: No       Family Status   Relation Name Status   • Mo     • Fa     History reviewed. No pertinent family history.    Review of Systems: See HPI above.   Constitutional: Negative for fever, chills, weight loss and malaise.   HENT: Negative for ear pain, nosebleeds, congestion, sore throat and neck pain.    Eyes: Negative for blurred vision.   Respiratory: positive for shortness of breath onexertion when outside with cough without sputum production and wheezing.    Cardiovascular: Negative for chest pain, palpitations, orthopnea and leg swelling.   Gastrointestinal: Negative for heartburn, nausea, vomiting and abdominal pain.   Genitourinary: Negative for dysuria, urgency and frequency.   Musculoskeletal: Negative for myalgias, back pain and joint pain.   Skin: Negative for rash and itching.   Neurological: Negative for dizziness, tremors, focal weakness and headaches.  Positive for bilateral lower extremity neuropathy  Endo/Heme/Allergies: Does not  bruise/bleed easily.   Psychiatric/Behavioral: Negative for depression, suicidal ideas and memory loss.  The patient is not nervous/anxious and does not have insomnia.      Exam:  /80 (BP Location: Right arm, Patient Position: Sitting, BP Cuff Size: Adult)   Pulse 86   Temp 36.8 °C (98.2 °F) (Temporal)   Resp 16   Ht 1.829 m (6')   Wt 103.4 kg (228 lb)   SpO2 96%   Body mass index is 30.92 kg/m².  General:  Overweight male in NAD  Head: is grossly normal.  Neck: Supple without masses. Thyroid is not visibly enlarged.  Pulmonary: Clear to ausculation. Normal effort. No rales, ronchi, or wheezing.  Cardiovascular: Regular rate and rhythm without murmur. Carotid pulses are intact and equal bilaterally.  Extremities: no clubbing, cyanosis, or edema.    Medical decision-making and discussion:  1. Type 2 diabetes mellitus with other circulatory complication, without long-term current use of insulin (HCC)  Due to this patient's poor compliance with Victoza because he cannot r remember to take this every evening, his A1c is not as well-controlled as it might otherwise be.  Due to this, he is an excellent candidate for a once weekly injection which will easily replace Victoza.  - POCT  A1C  - Diabetic Monofilament Lower Extremity Exam  - MICROALBUMIN CREAT RATIO URINE (CLINIC COLLECT); Future  - Comp Metabolic Panel; Future  - Lipid Profile; Future  - CBC WITHOUT DIFFERENTIAL; Future  - aspirin (ASA) 81 MG Chew Tab chewable tablet; Take 1 Tab by mouth every day.  Dispense: 200 Tab; Refill: 1  - Semaglutide, 1 MG/DOSE, 2 MG/1.5ML Solution Pen-injector; Inject 1 mg as instructed every 7 days.  Dispense: 6 PEN; Refill: 3    2. COPD with acute exacerbation (HCC)    - ipratropium-albuterol (COMBIVENT RESPIMAT)  MCG/ACT Aero Soln; Inhale 1 Puff by mouth 4 times a day.  Dispense: 3 Inhaler; Refill: 3  - CBC WITHOUT DIFFERENTIAL; Future  - aspirin (ASA) 81 MG Chew Tab chewable tablet; Take 1 Tab by mouth every day.   Dispense: 200 Tab; Refill: 1  - albuterol 108 (90 Base) MCG/ACT Aero Soln inhalation aerosol; Inhale 2 Puffs by mouth 2 times a day as needed for Shortness of Breath.  Dispense: 17 g; Refill: 5    3. Obesity (BMI 30-39.9)      4. Acute on chronic combined systolic and diastolic congestive heart failure (HCC)    - aspirin (ASA) 81 MG Chew Tab chewable tablet; Take 1 Tab by mouth every day.  Dispense: 200 Tab; Refill: 1  - furosemide (LASIX) 40 MG Tab; Take 1 Tab by mouth every day.  Dispense: 90 Tab; Refill: 1  - metoprolol (LOPRESSOR) 25 MG Tab; Take 1 Tab by mouth 2 times a day.  Dispense: 180 Tab; Refill: 3  - lisinopril (PRINIVIL) 10 MG Tab; Take 1 Tab by mouth every day.  Dispense: 90 Tab; Refill: 1    5. Restless leg syndrome, controlled    - ROPINIRole (REQUIP) 1 MG Tab; Take 3 Tabs by mouth every bedtime.  Dispense: 270 Tab; Refill: 01      Please note that this dictation was created using voice recognition software. I have made every reasonable attempt to correct obvious errors, but I expect that there are errors of grammar and possibly content that I did not discover before finalizing the note.      Return in about 6 weeks (around 4/22/2020) for diabetes, blood work completed prior to next appt.

## 2020-03-11 NOTE — ASSESSMENT & PLAN NOTE
.Last A1c: 7.5% 3/11/20, 7.3% 1/13/20,  7.8% 7/11/19, 7.0% 02/07/19  DM Medications: metformin 1000 mg twice per day and victoza 1.8mg every other night. Patient reports poor medication compliance.   HTN: Blood pressure goal <140/<90 No  ACE: lisinopril 10 mg tablet every morning  Hyperlipidemia: Cholesterol goal LDL <100 yes   Currently Rx Statin: Pravastatin 20 mg every evening.  Diabetic diet: No, poor diet except has cut out bread.   Exercise: walking occasionally  Last monofilament foot exam: reduced sensation in forefoot, cyanosis, callous on left second toe. Checks feet at home: Not recently, no sores currently   Last Eye exam: none recently   Kidney function: GFR >60 12 /18  Microalbumin screening: wnl 12/18  Has patient received flu vaccine: Yes  Has patient received Hep B series:Yes    A1c goal <7 no  Current barriers to control include poor diet.   Glucose monitoring frequency: rarely  Fasting sugars:   Hypoglycemic episodes none recently   Diabetic complications: cardiovascular disease, neuropathy,     The patient is taking ASA every day and is not taking all other medications as prescribed. . Patient denies any side effects of medication.

## 2020-03-11 NOTE — ASSESSMENT & PLAN NOTE
Patient smoked for about 40 years. He now is only using albuterol on a rescue basis but with recent changes in allergens in the air, he feels this is getting more frequent and worse.

## 2020-03-12 LAB
CREAT UR-MCNC: 27.9 MG/DL
MICROALBUMIN UR-MCNC: <0.7 MG/DL
MICROALBUMIN/CREAT UR: NORMAL MG/G (ref 0–30)

## 2020-03-23 DIAGNOSIS — I50.43 ACUTE ON CHRONIC COMBINED SYSTOLIC AND DIASTOLIC CONGESTIVE HEART FAILURE (HCC): ICD-10-CM

## 2020-03-23 RX ORDER — POTASSIUM CHLORIDE 20 MEQ/1
TABLET, EXTENDED RELEASE ORAL
Qty: 180 TAB | Refills: 0 | Status: SHIPPED | OUTPATIENT
Start: 2020-03-23 | End: 2020-07-23 | Stop reason: SDUPTHER

## 2020-03-23 NOTE — TELEPHONE ENCOUNTER
Was the patient seen in the last year in this department? Yes    Does patient have an active prescription for medications requested? Yes    Received Request Via: Pharmacy    Hospital Outpatient Visit on 03/11/2020   Component Date Value   • Creatinine, Urine 03/11/2020 27.90    • Microalbumin, Urine Rand* 03/11/2020 <0.7    • Micro Alb Creat Ratio 03/11/2020 see below    Office Visit on 03/11/2020   Component Date Value   • Glycohemoglobin 03/11/2020 7.5*   Office Visit on 01/13/2020   Component Date Value   • Glycohemoglobin 01/13/2020 7.3*   • Internal Control Negative 01/13/2020 Negative    • Internal Control Positive 01/13/2020 Positive    Office Visit on 07/10/2019   Component Date Value   • Glycohemoglobin 07/10/2019 7.8*   • Internal Control Negative 07/10/2019 Valid    • Internal Control Positive 07/10/2019 Valid    ]

## 2020-04-06 ENCOUNTER — TELEPHONE (OUTPATIENT)
Dept: MEDICAL GROUP | Facility: CLINIC | Age: 65
End: 2020-04-06

## 2020-04-06 NOTE — TELEPHONE ENCOUNTER
VOICEMAIL  1. Caller Name: Mateo Hunt                      Call Back Number: [phone] (home)     2. Message: Pt reports hoarse voice, productive cough (green) and want to make sure he doesn't have something serious.    3. Patient approves office to leave a detailed voicemail/MyChart message: yes

## 2020-04-06 NOTE — TELEPHONE ENCOUNTER
Attempted to call a fourth phone number for this patient [phone]. This phone number does have a voice mailbox that is full and is not accepting messages.

## 2020-04-06 NOTE — TELEPHONE ENCOUNTER
Attempted to call a different number in order to attempt to reach this patient, [phone] and this phone number has been disconnected.

## 2020-04-06 NOTE — TELEPHONE ENCOUNTER
Attempted to returned patient's call about upper respiratory symptoms at 13:28 however the person who answered the phone number on file states that it was a wrong number and was quite upset upon redial.     When attempting to call the second phone number on file the message states that the number has prevented the call from going through because it has been blocked.

## 2020-06-25 DIAGNOSIS — I50.43 ACUTE ON CHRONIC COMBINED SYSTOLIC AND DIASTOLIC CONGESTIVE HEART FAILURE (HCC): ICD-10-CM

## 2020-06-25 DIAGNOSIS — E11.9 TYPE 2 DIABETES MELLITUS WITHOUT COMPLICATION, WITHOUT LONG-TERM CURRENT USE OF INSULIN (HCC): ICD-10-CM

## 2020-06-25 DIAGNOSIS — E11.59 TYPE 2 DIABETES MELLITUS WITH OTHER CIRCULATORY COMPLICATION, WITHOUT LONG-TERM CURRENT USE OF INSULIN (HCC): ICD-10-CM

## 2020-06-25 RX ORDER — LISINOPRIL 10 MG/1
10 TABLET ORAL
Qty: 90 TAB | Refills: 0 | Status: SHIPPED | OUTPATIENT
Start: 2020-06-25 | End: 2020-08-26

## 2020-06-25 RX ORDER — PRAVASTATIN SODIUM 20 MG
20 TABLET ORAL
Qty: 90 TAB | Refills: 0 | Status: SHIPPED | OUTPATIENT
Start: 2020-06-25 | End: 2020-08-26

## 2020-06-25 NOTE — TELEPHONE ENCOUNTER
Was the patient seen in the last year in this department? Yes    Does patient have an active prescription for medications requested? Yes    Received Request Via: Patient    Hospital Outpatient Visit on 03/11/2020   Component Date Value   • Creatinine, Urine 03/11/2020 27.90    • Microalbumin, Urine Rand* 03/11/2020 <0.7    • Micro Alb Creat Ratio 03/11/2020 see below    Office Visit on 03/11/2020   Component Date Value   • Glycohemoglobin 03/11/2020 7.5*   Office Visit on 01/13/2020   Component Date Value   • Glycohemoglobin 01/13/2020 7.3*   • Internal Control Negative 01/13/2020 Negative    • Internal Control Positive 01/13/2020 Positive    Office Visit on 07/10/2019   Component Date Value   • Glycohemoglobin 07/10/2019 7.8*   • Internal Control Negative 07/10/2019 Valid    • Internal Control Positive 07/10/2019 Valid    ]

## 2020-07-23 ENCOUNTER — OFFICE VISIT (OUTPATIENT)
Dept: MEDICAL GROUP | Facility: CLINIC | Age: 65
End: 2020-07-23
Payer: MEDICAID

## 2020-07-23 VITALS
SYSTOLIC BLOOD PRESSURE: 126 MMHG | OXYGEN SATURATION: 92 % | DIASTOLIC BLOOD PRESSURE: 80 MMHG | WEIGHT: 230.6 LBS | BODY MASS INDEX: 31.27 KG/M2 | TEMPERATURE: 97.9 F | HEART RATE: 77 BPM | RESPIRATION RATE: 16 BRPM

## 2020-07-23 DIAGNOSIS — Z86.010 HISTORY OF COLONOSCOPY WITH POLYPECTOMY: ICD-10-CM

## 2020-07-23 DIAGNOSIS — Z12.11 COLON CANCER SCREENING: ICD-10-CM

## 2020-07-23 DIAGNOSIS — E11.59 TYPE 2 DIABETES MELLITUS WITH OTHER CIRCULATORY COMPLICATION, WITHOUT LONG-TERM CURRENT USE OF INSULIN (HCC): ICD-10-CM

## 2020-07-23 DIAGNOSIS — J44.1 COPD WITH ACUTE EXACERBATION (HCC): ICD-10-CM

## 2020-07-23 DIAGNOSIS — E11.9 TYPE 2 DIABETES MELLITUS WITHOUT COMPLICATION, WITHOUT LONG-TERM CURRENT USE OF INSULIN (HCC): ICD-10-CM

## 2020-07-23 DIAGNOSIS — G25.81 RESTLESS LEG SYNDROME, CONTROLLED: ICD-10-CM

## 2020-07-23 DIAGNOSIS — Z98.890 HISTORY OF COLONOSCOPY WITH POLYPECTOMY: ICD-10-CM

## 2020-07-23 DIAGNOSIS — I50.43 ACUTE ON CHRONIC COMBINED SYSTOLIC AND DIASTOLIC CONGESTIVE HEART FAILURE (HCC): ICD-10-CM

## 2020-07-23 PROCEDURE — 99214 OFFICE O/P EST MOD 30 MIN: CPT | Performed by: PHYSICIAN ASSISTANT

## 2020-07-23 RX ORDER — ASPIRIN 81 MG/1
81 TABLET, CHEWABLE ORAL DAILY
Qty: 200 TAB | Refills: 1 | Status: SHIPPED | OUTPATIENT
Start: 2020-07-23

## 2020-07-23 RX ORDER — METHYLPREDNISOLONE 4 MG/1
TABLET ORAL
Qty: 21 TAB | Refills: 0 | Status: SHIPPED | OUTPATIENT
Start: 2020-07-23 | End: 2020-08-26

## 2020-07-23 RX ORDER — POTASSIUM CHLORIDE 20 MEQ/1
20 TABLET, EXTENDED RELEASE ORAL 2 TIMES DAILY
Qty: 180 TAB | Refills: 1 | Status: SHIPPED | OUTPATIENT
Start: 2020-07-23 | End: 2021-02-12

## 2020-07-23 RX ORDER — ROPINIROLE 5 MG/1
5 TABLET, FILM COATED ORAL DAILY
Qty: 30 TAB | Refills: 1 | Status: SHIPPED | OUTPATIENT
Start: 2020-07-23 | End: 2020-08-26 | Stop reason: SDUPTHER

## 2020-07-23 RX ORDER — FUROSEMIDE 40 MG/1
40 TABLET ORAL
Qty: 90 TAB | Refills: 1 | Status: SHIPPED | OUTPATIENT
Start: 2020-07-23 | End: 2021-03-16

## 2020-07-23 RX ORDER — ALBUTEROL SULFATE 90 UG/1
2 AEROSOL, METERED RESPIRATORY (INHALATION) 2 TIMES DAILY PRN
Qty: 17 G | Refills: 5 | Status: SHIPPED | OUTPATIENT
Start: 2020-07-23 | End: 2021-08-23

## 2020-07-23 NOTE — PROGRESS NOTES
cc:  Establish care    Subjective:     Mateo Hunt is a 64 y.o. male presenting for [facility] care      Patient presents to the office for [facility] care.  Patient states that he is having increased leg pain and states he has increased leg kicking at night which keeps him up at night.  He was taking about 123 1 mg tablets at night but even with the increase although improved, he is still having symptoms.    Patient is also due for his colonoscopy.  He had a colonoscopy approximately 5 years ago but was told to repeat in 5 years.    Patient does have a history of COPD and indicates that lately he has been having some increased shortness of breath.  He states that it only lasts seconds.  However, it is becoming more frequent.  He does indicate that he needs a refill of his inhalers at this time and is requesting that we refill.  He states the symptoms are minor but they seem to be worse in the morning when he wakes up.    Patient does have a history of type 2 diabetes and obesity.  He also has a history of congestive heart failure.  He is due for routine lab work at this time.    Review of systems:  See above.   Denies any symptoms unless previously indicated.        Current Outpatient Medications:   •  ropinirole (REQUIP) 5 MG tablet, Take 1 Tab by mouth every day., Disp: 30 Tab, Rfl: 1  •  potassium chloride SA (KDUR) 20 MEQ Tab CR, Take 1 Tab by mouth 2 Times a Day., Disp: 180 Tab, Rfl: 1  •  aspirin (ASA) 81 MG Chew Tab chewable tablet, Take 1 Tab by mouth every day., Disp: 200 Tab, Rfl: 1  •  furosemide (LASIX) 40 MG Tab, Take 1 Tab by mouth every day., Disp: 90 Tab, Rfl: 1  •  albuterol 108 (90 Base) MCG/ACT Aero Soln inhalation aerosol, Inhale 2 Puffs by mouth 2 times a day as needed for Shortness of Breath., Disp: 17 g, Rfl: 5  •  ipratropium-albuterol (COMBIVENT RESPIMAT)  MCG/ACT Aero Soln, Inhale 1 Puff by mouth 4 times a day., Disp: 12 g, Rfl: 1  •  methylPREDNISolone (MEDROL DOSEPAK) 4 MG  Tablet Therapy Pack, Follow schedule on package instructions., Disp: 21 Tab, Rfl: 0  •  lisinopril (PRINIVIL) 10 MG Tab, Take 1 Tab by mouth every day., Disp: 90 Tab, Rfl: 0  •  Semaglutide, 1 MG/DOSE, 2 MG/1.5ML Solution Pen-injector, Inject 1 mg as instructed every 7 days., Disp: 6 PEN, Rfl: 0  •  metoprolol (LOPRESSOR) 25 MG Tab, Take 1 Tab by mouth 2 times a day., Disp: 180 Tab, Rfl: 0  •  pravastatin (PRAVACHOL) 20 MG Tab, Take 1 Tab by mouth every bedtime., Disp: 90 Tab, Rfl: 0  •  Insulin Pen Needle (PEN NEEDLES) 32G X 4 MM Misc, 1 Each by Does not apply route every day., Disp: 18 Each, Rfl: 3  •  glucose blood strip, 1 Strip by Other route every day., Disp: 100 Strip, Rfl: 1  •  metformin (GLUCOPHAGE) 1000 MG tablet, Take 1 Tab by mouth 2 times a day, with meals. (Patient not taking: Reported on 7/23/2020), Disp: 180 Tab, Rfl: 3    Allergies, past medical history, past surgical history, family history, social history reviewed and updated    Objective:     Vitals: /80 (BP Location: Left arm, Patient Position: Sitting, BP Cuff Size: Adult)   Pulse 77   Temp 36.6 °C (97.9 °F) (Temporal)   Resp 16   Wt 104.6 kg (230 lb 9.6 oz)   SpO2 92%   BMI 31.27 kg/m²   General: Alert, pleasant, NAD  EYES:   PERRL, EOMI, no icterus or pallor.  Conjunctivae and lids normal.   HENT:  Normocephalic.  External ears normal.  Neck supple.     Heart: Regular rate and rhythm.  S1 and S2 normal.  No murmurs appreciated.  Respiratory: Normal respiratory effort.  Decreased absent breath sounds all lung fields.  Abdomen: Obese  Skin: Warm, dry, no rashes.  Musculoskeletal: Gait is normal.  Moves all extremities well.    Extremities: Normal range of motion all extremities.   Neurological: No tremors, sensation grossly intact,  CN2-12 intact.  Psych:  Affect/mood is normal, judgement is good, memory is intact, grooming is appropriate.    Assessment/Plan:     Mateo DAMON was seen today for establish care, rash and medication  refill.    Diagnoses and all orders for this visit:    Restless leg syndrome, controlled  -     ropinirole (REQUIP) 5 MG tablet; Take 1 Tab by mouth every day.    Has already adjusted his dose up to 5 mg and so ordering new prescription.  As he has been on the medication for only a week, we will give him a month on this dose and follow-up.    COPD with acute exacerbation (HCC)  -     aspirin (ASA) 81 MG Chew Tab chewable tablet; Take 1 Tab by mouth every day.  -     albuterol 108 (90 Base) MCG/ACT Aero Soln inhalation aerosol; Inhale 2 Puffs by mouth 2 times a day as needed for Shortness of Breath.  -     ipratropium-albuterol (COMBIVENT RESPIMAT)  MCG/ACT Aero Soln; Inhale 1 Puff by mouth 4 times a day.  -     DX-CHEST-2 VIEWS; Future  -     methylPREDNISolone (MEDROL DOSEPAK) 4 MG Tablet Therapy Pack; Follow schedule on package instructions.    We will start patient on a Medrol Dosepak.  We will also obtain a chest x-ray and refill his inhalers.  Will recheck in 4 weeks.    Acute on chronic combined systolic and diastolic congestive heart failure (HCC)  -     potassium chloride SA (KDUR) 20 MEQ Tab CR; Take 1 Tab by mouth 2 Times a Day.  -     aspirin (ASA) 81 MG Chew Tab chewable tablet; Take 1 Tab by mouth every day.  -     furosemide (LASIX) 40 MG Tab; Take 1 Tab by mouth every day.   Type 2 diabetes mellitus with other circulatory complication, without long-term current use of insulin (HCC)  -     aspirin (ASA) 81 MG Chew Tab chewable tablet; Take 1 Tab by mouth every day.  -     Lipid Profile; Future  -     Comp Metabolic Panel; Future  -     TSH; Future  -     HEMOGLOBIN A1C; Future  -     VITAMIN D,25 HYDROXY; Future  -     FREE THYROXINE; Future  Type 2 diabetes mellitus without complication, without long-term current use of insulin (HCC)    Labs been ordered to evaluate further.  We will follow-up with 4-week appointment.    Colon cancer screening  -     REFERRAL TO GI FOR COLONOSCOPY  History of  colonoscopy with polypectomy      Referral for colonoscopy submitted.      No follow-ups on file.    Please note that this dictation was created using voice recognition software. I have made every reasonable attempt to correct obvious errors, but expect that there are errors of grammar and possible content that I did not discover before finalizing note.

## 2020-08-24 ENCOUNTER — APPOINTMENT (OUTPATIENT)
Dept: RADIOLOGY | Facility: IMAGING CENTER | Age: 65
End: 2020-08-24
Attending: PHYSICIAN ASSISTANT
Payer: MEDICAID

## 2020-08-24 ENCOUNTER — APPOINTMENT (OUTPATIENT)
Dept: URGENT CARE | Facility: PHYSICIAN GROUP | Age: 65
End: 2020-08-24
Payer: MEDICAID

## 2020-08-24 DIAGNOSIS — J44.1 COPD WITH ACUTE EXACERBATION (HCC): ICD-10-CM

## 2020-08-24 PROCEDURE — 71046 X-RAY EXAM CHEST 2 VIEWS: CPT | Mod: TC | Performed by: FAMILY MEDICINE

## 2020-08-25 ENCOUNTER — HOSPITAL ENCOUNTER (OUTPATIENT)
Facility: MEDICAL CENTER | Age: 65
End: 2020-08-25
Attending: PHYSICIAN ASSISTANT
Payer: MEDICAID

## 2020-08-25 ENCOUNTER — NON-PROVIDER VISIT (OUTPATIENT)
Dept: MEDICAL GROUP | Facility: CLINIC | Age: 65
End: 2020-08-25
Payer: MEDICAID

## 2020-08-25 DIAGNOSIS — E11.59 TYPE 2 DIABETES MELLITUS WITH OTHER CIRCULATORY COMPLICATION, WITHOUT LONG-TERM CURRENT USE OF INSULIN (HCC): ICD-10-CM

## 2020-08-25 DIAGNOSIS — J44.1 COPD WITH ACUTE EXACERBATION (HCC): ICD-10-CM

## 2020-08-25 LAB
25(OH)D3 SERPL-MCNC: 25 NG/ML (ref 30–100)
ALBUMIN SERPL BCP-MCNC: 3.9 G/DL (ref 3.2–4.9)
ALBUMIN/GLOB SERPL: 1.3 G/DL
ALP SERPL-CCNC: 121 U/L (ref 30–99)
ALT SERPL-CCNC: 47 U/L (ref 2–50)
ANION GAP SERPL CALC-SCNC: 11 MMOL/L (ref 7–16)
AST SERPL-CCNC: 36 U/L (ref 12–45)
BILIRUB SERPL-MCNC: 0.4 MG/DL (ref 0.1–1.5)
BUN SERPL-MCNC: 12 MG/DL (ref 8–22)
CALCIUM SERPL-MCNC: 9.3 MG/DL (ref 8.5–10.5)
CHLORIDE SERPL-SCNC: 99 MMOL/L (ref 96–112)
CHOLEST SERPL-MCNC: 170 MG/DL (ref 100–199)
CO2 SERPL-SCNC: 27 MMOL/L (ref 20–33)
CREAT SERPL-MCNC: 1.11 MG/DL (ref 0.5–1.4)
ERYTHROCYTE [DISTWIDTH] IN BLOOD BY AUTOMATED COUNT: 42.8 FL (ref 35.9–50)
EST. AVERAGE GLUCOSE BLD GHB EST-MCNC: 200 MG/DL
GLOBULIN SER CALC-MCNC: 3 G/DL (ref 1.9–3.5)
GLUCOSE SERPL-MCNC: 200 MG/DL (ref 65–99)
HBA1C MFR BLD: 8.6 % (ref 0–5.6)
HCT VFR BLD AUTO: 50.7 % (ref 42–52)
HDLC SERPL-MCNC: 32 MG/DL
HGB BLD-MCNC: 17 G/DL (ref 14–18)
LDLC SERPL CALC-MCNC: ABNORMAL MG/DL
MCH RBC QN AUTO: 32.2 PG (ref 27–33)
MCHC RBC AUTO-ENTMCNC: 33.5 G/DL (ref 33.7–35.3)
MCV RBC AUTO: 96 FL (ref 81.4–97.8)
PLATELET # BLD AUTO: 216 K/UL (ref 164–446)
PMV BLD AUTO: 11.3 FL (ref 9–12.9)
POTASSIUM SERPL-SCNC: 4.3 MMOL/L (ref 3.6–5.5)
PROT SERPL-MCNC: 6.9 G/DL (ref 6–8.2)
RBC # BLD AUTO: 5.28 M/UL (ref 4.7–6.1)
SODIUM SERPL-SCNC: 137 MMOL/L (ref 135–145)
T4 FREE SERPL-MCNC: 0.98 NG/DL (ref 0.93–1.7)
TRIGL SERPL-MCNC: 428 MG/DL (ref 0–149)
TSH SERPL DL<=0.005 MIU/L-ACNC: 2.07 UIU/ML (ref 0.38–5.33)
WBC # BLD AUTO: 9.8 K/UL (ref 4.8–10.8)

## 2020-08-25 PROCEDURE — 85027 COMPLETE CBC AUTOMATED: CPT

## 2020-08-25 PROCEDURE — 84439 ASSAY OF FREE THYROXINE: CPT

## 2020-08-25 PROCEDURE — 36415 COLL VENOUS BLD VENIPUNCTURE: CPT | Performed by: PHYSICIAN ASSISTANT

## 2020-08-25 PROCEDURE — 80061 LIPID PANEL: CPT

## 2020-08-25 PROCEDURE — 84443 ASSAY THYROID STIM HORMONE: CPT

## 2020-08-25 PROCEDURE — 82306 VITAMIN D 25 HYDROXY: CPT

## 2020-08-25 PROCEDURE — 83036 HEMOGLOBIN GLYCOSYLATED A1C: CPT

## 2020-08-25 PROCEDURE — 80053 COMPREHEN METABOLIC PANEL: CPT

## 2020-08-26 ENCOUNTER — OFFICE VISIT (OUTPATIENT)
Dept: MEDICAL GROUP | Facility: CLINIC | Age: 65
End: 2020-08-26
Payer: MEDICAID

## 2020-08-26 VITALS
HEIGHT: 72 IN | HEART RATE: 85 BPM | TEMPERATURE: 97 F | SYSTOLIC BLOOD PRESSURE: 178 MMHG | DIASTOLIC BLOOD PRESSURE: 110 MMHG | OXYGEN SATURATION: 92 % | WEIGHT: 236 LBS | RESPIRATION RATE: 16 BRPM | BODY MASS INDEX: 31.97 KG/M2

## 2020-08-26 DIAGNOSIS — G25.81 RESTLESS LEG SYNDROME, CONTROLLED: ICD-10-CM

## 2020-08-26 DIAGNOSIS — E66.9 OBESITY (BMI 30-39.9): ICD-10-CM

## 2020-08-26 DIAGNOSIS — E78.49 OTHER HYPERLIPIDEMIA: ICD-10-CM

## 2020-08-26 DIAGNOSIS — I50.43 ACUTE ON CHRONIC COMBINED SYSTOLIC AND DIASTOLIC CONGESTIVE HEART FAILURE (HCC): ICD-10-CM

## 2020-08-26 DIAGNOSIS — I10 ESSENTIAL HYPERTENSION: ICD-10-CM

## 2020-08-26 DIAGNOSIS — E55.9 VITAMIN D DEFICIENCY: ICD-10-CM

## 2020-08-26 DIAGNOSIS — E11.9 TYPE 2 DIABETES MELLITUS WITHOUT COMPLICATION, WITHOUT LONG-TERM CURRENT USE OF INSULIN (HCC): ICD-10-CM

## 2020-08-26 DIAGNOSIS — R06.02 SHORTNESS OF BREATH: ICD-10-CM

## 2020-08-26 DIAGNOSIS — I49.3 PVC (PREMATURE VENTRICULAR CONTRACTION): ICD-10-CM

## 2020-08-26 DIAGNOSIS — J44.1 COPD WITH ACUTE EXACERBATION (HCC): ICD-10-CM

## 2020-08-26 PROCEDURE — 99214 OFFICE O/P EST MOD 30 MIN: CPT | Performed by: PHYSICIAN ASSISTANT

## 2020-08-26 RX ORDER — ROPINIROLE 5 MG/1
5 TABLET, FILM COATED ORAL DAILY
Qty: 90 TAB | Refills: 1 | Status: SHIPPED | OUTPATIENT
Start: 2020-08-26 | End: 2021-01-25

## 2020-08-26 RX ORDER — ERGOCALCIFEROL 1.25 MG/1
50000 CAPSULE ORAL
Qty: 15 CAP | Refills: 2 | Status: SHIPPED | OUTPATIENT
Start: 2020-08-26 | End: 2021-06-17

## 2020-08-26 RX ORDER — PRAVASTATIN SODIUM 40 MG
40 TABLET ORAL DAILY
Qty: 90 TAB | Refills: 1 | Status: SHIPPED | OUTPATIENT
Start: 2020-08-26 | End: 2020-11-13

## 2020-08-26 RX ORDER — METOPROLOL TARTRATE 50 MG/1
50 TABLET, FILM COATED ORAL 2 TIMES DAILY
Qty: 180 TAB | Refills: 1 | Status: SHIPPED | OUTPATIENT
Start: 2020-08-26 | End: 2020-09-10 | Stop reason: SDUPTHER

## 2020-08-26 RX ORDER — LISINOPRIL 10 MG/1
10 TABLET ORAL DAILY
Qty: 90 TAB | Refills: 1 | Status: SHIPPED | OUTPATIENT
Start: 2020-08-26 | End: 2020-11-13 | Stop reason: SDUPTHER

## 2020-08-26 SDOH — HEALTH STABILITY: MENTAL HEALTH: HOW OFTEN DO YOU HAVE A DRINK CONTAINING ALCOHOL?: 2-3 TIMES A WEEK

## 2020-08-26 ASSESSMENT — FIBROSIS 4 INDEX: FIB4 SCORE: 1.56

## 2020-08-26 NOTE — PROGRESS NOTES
cc:  Follow up    Subjective:     Mateo Hunt is a 64 y.o. male presenting for follow up      Patient presents to the office for follow up.  Patient has noticed that his blood pressure has been higher lately.  He states that the top number has been 150 and the bottom number is 105. He states he has known this for 2-3 months.       Patient has a history of type 2 diabetes not controlled.  He has not been taking metformin for 3-4 months.  He states that he was talking to a friend who had significant side effects from the Metformin.  Although he was not having any significant issues, he decided that he did not want side effects and so he stopped the medication.        Patient is informed today that he has a vitamin D deficiency.  He just started taking a multivitamin.  He questions whether the multivitamin would be enough for his vitamin D deficiency or if further help is needed.    He states that he has been having shortness of breath but states that it has been better lately.  He denies chest pain.    Patient states that he does drink alcohol on a near daily basis.  He indicates that he will work on cutting back on the alcohol.      Patient is needing a refill of the requip for his restless leg.  He states that the current dose is working for him.      Patient is here to discuss his most recent labs.  He is also concerned about his chest xray results as he hs been having shortness fo breath.      Review of systems:  See above.   Denies any symptoms unless previously indicated.        Current Outpatient Medications:   •  metformin (GLUCOPHAGE) 1000 MG tablet, Take 1 Tab by mouth 2 times a day, with meals., Disp: 180 Tab, Rfl: 3  •  ropinirole (REQUIP) 5 MG tablet, Take 1 Tab by mouth every day., Disp: 90 Tab, Rfl: 1  •  pravastatin (PRAVACHOL) 40 MG tablet, Take 1 Tab by mouth every day., Disp: 90 Tab, Rfl: 1  •  lisinopril (PRINIVIL) 10 MG Tab, Take 1 Tab by mouth every day., Disp: 90 Tab, Rfl: 1  •   metoprolol (LOPRESSOR) 50 MG Tab, Take 1 Tab by mouth 2 times a day., Disp: 180 Tab, Rfl: 1  •  vitamin D, Ergocalciferol, (DRISDOL) 1.25 MG (24168 UT) Cap capsule, Take 1 Cap by mouth every 7 days., Disp: 15 Cap, Rfl: 2  •  potassium chloride SA (KDUR) 20 MEQ Tab CR, Take 1 Tab by mouth 2 Times a Day., Disp: 180 Tab, Rfl: 1  •  aspirin (ASA) 81 MG Chew Tab chewable tablet, Take 1 Tab by mouth every day., Disp: 200 Tab, Rfl: 1  •  furosemide (LASIX) 40 MG Tab, Take 1 Tab by mouth every day., Disp: 90 Tab, Rfl: 1  •  albuterol 108 (90 Base) MCG/ACT Aero Soln inhalation aerosol, Inhale 2 Puffs by mouth 2 times a day as needed for Shortness of Breath., Disp: 17 g, Rfl: 5  •  ipratropium-albuterol (COMBIVENT RESPIMAT)  MCG/ACT Aero Soln, Inhale 1 Puff by mouth 4 times a day., Disp: 12 g, Rfl: 1  •  Semaglutide, 1 MG/DOSE, 2 MG/1.5ML Solution Pen-injector, Inject 1 mg as instructed every 7 days., Disp: 6 PEN, Rfl: 0  •  metoprolol (LOPRESSOR) 25 MG Tab, Take 1 Tab by mouth 2 times a day., Disp: 180 Tab, Rfl: 0  •  Insulin Pen Needle (PEN NEEDLES) 32G X 4 MM Misc, 1 Each by Does not apply route every day., Disp: 18 Each, Rfl: 3  •  glucose blood strip, 1 Strip by Other route every day., Disp: 100 Strip, Rfl: 1    Allergies, past medical history, past surgical history, family history, social history reviewed and updated    Objective:     Vitals: BP (!) 178/110 (BP Location: Left arm, Patient Position: Sitting, BP Cuff Size: Adult)   Pulse 85   Temp 36.1 °C (97 °F) (Temporal)   Resp 16   Ht 1.829 m (6')   Wt 107 kg (236 lb)   SpO2 92%   BMI 32.01 kg/m²   General: Alert, pleasant, NAD  EYES:   PERRL, EOMI, no icterus or pallor.  Conjunctivae and lids normal.   HENT:  Normocephalic.  External ears normal.   No nasal drainage present.    Neck supple.   .  Heart: PVCs with exam.  Respiratory: Normal respiratory effort.  Clear to auscultation bilaterally.  Abdomen: Obese  Skin: Warm, dry, no  rashes.  Musculoskeletal: Gait is normal.  Moves all extremities well.    Extremities: Normal range of motion all extremities..   Neurological: No tremors, sensation grossly intact, CN2-12 intact.  Psych:  Affect/mood is normal, judgement is good, memory is intact, grooming is appropriate.  EKG: PVCs present with potential left ventricular hypertrophy.    Assessment/Plan:     Mateo DAMON was seen today for lab results and overdue lab and imaging result follow-up.    Diagnoses and all orders for this visit:    Essential hypertension  -     EKG - Clinic Performed  -     Lipid Profile; Future  -     Comp Metabolic Panel; Future  -     LDL, DIRECT; Future  -     lisinopril (PRINIVIL) 10 MG Tab; Take 1 Tab by mouth every day.  -     metoprolol (LOPRESSOR) 50 MG Tab; Take 1 Tab by mouth 2 times a day.    Not controlled.  Concerning actually.  EKG shows abnormalities with PVC.  Recommended to ER.  Patient declined at this time indicating that he had other things to do and that he would try to call later.  AMA signed at this time.  We will plan to follow-up in 1 to 2 weeks.  He knows that with any worsening symptoms including chest pain and shortness of breath-which she is already having, he should go to the ER.  Patient verbalized understanding to all instructions.    Type 2 diabetes mellitus without complication, without long-term current use of insulin ([facility])  -      AMA/Refusal of Treatment  -     metformin (GLUCOPHAGE) 1000 MG tablet; Take 1 Tab by mouth 2 times a day, with meals.  -     Lipid Profile; Future  -     Comp Metabolic Panel; Future  -     HEMOGLOBIN A1C; Future    Sugars not controlled.  I have recommended that he go back on the metformin at this time.  Although labs should be repeated in 3 months, I would like to reevaluate for improvement and so we will recheck labs in 1 month.    Vitamin D deficiency  -     vitamin D, Ergocalciferol, (DRISDOL) 1.25 MG (49653 UT) Cap capsule; Take 1 Cap by mouth every 7  days.    I do not believe that his multivitamin will have a sufficient vitamin D level for him and so we will order a supplement at this time.    COPD with acute exacerbation (HCC)  -     EKG - Clinic Performed    Difficult to tell if his symptoms are worsened with his heart.  I believe that this is a possibility.  Again I have recommended ER for patient.  Again he has declined and signed AMA.    Restless leg syndrome, controlled  -     ropinirole (REQUIP) 5 MG tablet; Take 1 Tab by mouth every day.    Medication refilled at this time.    Obesity (BMI 30-39.9)    Not controlled.  Alcohol also a contributing factor.  I have recommended that he decrease his use at this time.    Acute on chronic combined systolic and diastolic congestive heart failure (HCC)  -      AMA/Refusal of Treatment  -     REFERRAL TO CARDIOLOGY General Cardiology MD  -     lisinopril (PRINIVIL) 10 MG Tab; Take 1 Tab by mouth every day.  -     metoprolol (LOPRESSOR) 50 MG Tab; Take 1 Tab by mouth 2 times a day.  PVC (premature ventricular contraction)  -      AMA/Refusal of Treatment  -     REFERRAL TO CARDIOLOGY General Cardiology MD  -     lisinopril (PRINIVIL) 10 MG Tab; Take 1 Tab by mouth every day.  -     metoprolol (LOPRESSOR) 50 MG Tab; Take 1 Tab by mouth 2 times a day.  Other hyperlipidemia  -     Lipid Profile; Future  -     Comp Metabolic Panel; Future  -     LDL, DIRECT; Future  -     pravastatin (PRAVACHOL) 40 MG tablet; Take 1 Tab by mouth every day.  -     REFERRAL TO CARDIOLOGY General Cardiology MD  Shortness of breath    Urgent referral submitted to cardiology.  We have increased his metoprolol at this time to try and help control the PVCs he is experiencing.  Again labs to be repeated in 1 month.  The goal is to follow-up in a week if available.        Return in about 2 weeks (around 9/9/2020), or if symptoms worsen or fail to improve.    Please note that this dictation was created using voice recognition software. I have  made every reasonable attempt to correct obvious errors, but expect that there are errors of grammar and possible content that I did not discover before finalizing note.

## 2020-09-09 ENCOUNTER — NON-PROVIDER VISIT (OUTPATIENT)
Dept: MEDICAL GROUP | Facility: CLINIC | Age: 65
End: 2020-09-09
Payer: MEDICARE

## 2020-09-09 ENCOUNTER — HOSPITAL ENCOUNTER (OUTPATIENT)
Facility: MEDICAL CENTER | Age: 65
End: 2020-09-09
Attending: PHYSICIAN ASSISTANT
Payer: MEDICARE

## 2020-09-09 DIAGNOSIS — E11.9 TYPE 2 DIABETES MELLITUS WITHOUT COMPLICATION, WITHOUT LONG-TERM CURRENT USE OF INSULIN (HCC): ICD-10-CM

## 2020-09-09 DIAGNOSIS — I10 ESSENTIAL HYPERTENSION: ICD-10-CM

## 2020-09-09 DIAGNOSIS — E78.49 OTHER HYPERLIPIDEMIA: ICD-10-CM

## 2020-09-09 PROCEDURE — 83036 HEMOGLOBIN GLYCOSYLATED A1C: CPT

## 2020-09-09 PROCEDURE — 83721 ASSAY OF BLOOD LIPOPROTEIN: CPT | Mod: XU

## 2020-09-09 PROCEDURE — 80061 LIPID PANEL: CPT

## 2020-09-09 PROCEDURE — 80053 COMPREHEN METABOLIC PANEL: CPT

## 2020-09-09 PROCEDURE — 36415 COLL VENOUS BLD VENIPUNCTURE: CPT | Performed by: PHYSICIAN ASSISTANT

## 2020-09-10 ENCOUNTER — OFFICE VISIT (OUTPATIENT)
Dept: MEDICAL GROUP | Facility: CLINIC | Age: 65
End: 2020-09-10
Payer: MEDICAID

## 2020-09-10 VITALS
DIASTOLIC BLOOD PRESSURE: 82 MMHG | SYSTOLIC BLOOD PRESSURE: 122 MMHG | RESPIRATION RATE: 14 BRPM | HEIGHT: 73 IN | WEIGHT: 234 LBS | BODY MASS INDEX: 31.01 KG/M2 | HEART RATE: 77 BPM | OXYGEN SATURATION: 99 % | TEMPERATURE: 98.7 F

## 2020-09-10 DIAGNOSIS — R06.02 SHORTNESS OF BREATH: ICD-10-CM

## 2020-09-10 DIAGNOSIS — J44.1 COPD WITH ACUTE EXACERBATION (HCC): ICD-10-CM

## 2020-09-10 DIAGNOSIS — I49.3 PVC (PREMATURE VENTRICULAR CONTRACTION): ICD-10-CM

## 2020-09-10 DIAGNOSIS — I50.43 ACUTE ON CHRONIC COMBINED SYSTOLIC AND DIASTOLIC CONGESTIVE HEART FAILURE (HCC): ICD-10-CM

## 2020-09-10 DIAGNOSIS — E11.9 TYPE 2 DIABETES MELLITUS WITHOUT COMPLICATION, WITHOUT LONG-TERM CURRENT USE OF INSULIN (HCC): ICD-10-CM

## 2020-09-10 DIAGNOSIS — E78.49 OTHER HYPERLIPIDEMIA: ICD-10-CM

## 2020-09-10 DIAGNOSIS — I10 ESSENTIAL HYPERTENSION: ICD-10-CM

## 2020-09-10 LAB
ALBUMIN SERPL BCP-MCNC: 4 G/DL (ref 3.2–4.9)
ALBUMIN/GLOB SERPL: 1.3 G/DL
ALP SERPL-CCNC: 93 U/L (ref 30–99)
ALT SERPL-CCNC: 24 U/L (ref 2–50)
ANION GAP SERPL CALC-SCNC: 14 MMOL/L (ref 7–16)
AST SERPL-CCNC: 25 U/L (ref 12–45)
BILIRUB SERPL-MCNC: 0.6 MG/DL (ref 0.1–1.5)
BUN SERPL-MCNC: 15 MG/DL (ref 8–22)
CALCIUM SERPL-MCNC: 9.3 MG/DL (ref 8.5–10.5)
CHLORIDE SERPL-SCNC: 99 MMOL/L (ref 96–112)
CHOLEST SERPL-MCNC: 137 MG/DL (ref 100–199)
CO2 SERPL-SCNC: 26 MMOL/L (ref 20–33)
CREAT SERPL-MCNC: 1.02 MG/DL (ref 0.5–1.4)
EST. AVERAGE GLUCOSE BLD GHB EST-MCNC: 189 MG/DL
GLOBULIN SER CALC-MCNC: 3 G/DL (ref 1.9–3.5)
GLUCOSE SERPL-MCNC: 148 MG/DL (ref 65–99)
HBA1C MFR BLD: 8.2 % (ref 0–5.6)
HDLC SERPL-MCNC: 34 MG/DL
LDLC SERPL CALC-MCNC: 61 MG/DL
POTASSIUM SERPL-SCNC: 4.5 MMOL/L (ref 3.6–5.5)
PROT SERPL-MCNC: 7 G/DL (ref 6–8.2)
SODIUM SERPL-SCNC: 139 MMOL/L (ref 135–145)
TRIGL SERPL-MCNC: 209 MG/DL (ref 0–149)

## 2020-09-10 PROCEDURE — 99214 OFFICE O/P EST MOD 30 MIN: CPT | Performed by: PHYSICIAN ASSISTANT

## 2020-09-10 RX ORDER — IPRATROPIUM BROMIDE AND ALBUTEROL SULFATE 2.5; .5 MG/3ML; MG/3ML
3 SOLUTION RESPIRATORY (INHALATION) EVERY 4 HOURS PRN
Qty: 300 ML | Refills: 5 | Status: SHIPPED | OUTPATIENT
Start: 2020-09-10

## 2020-09-10 RX ORDER — METOPROLOL TARTRATE 50 MG/1
50 TABLET, FILM COATED ORAL 2 TIMES DAILY
Qty: 180 TAB | Refills: 1 | Status: SHIPPED | OUTPATIENT
Start: 2020-09-10 | End: 2020-11-13

## 2020-09-10 ASSESSMENT — FIBROSIS 4 INDEX: FIB4 SCORE: 1.51
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
## 2020-09-10 NOTE — LETTER
[employer] [facility]  Nicole Stevenson P.A.-C.  [address] [address] [address]  [facility] [address]  Fax: [phone]   Authorization for Release/Disclosure of   Protected Health Information   Name: MATEO RUELAS : 1955 SSN: [id number]   Address: [address] [place] [id number] Phone:    [phone] (home)    I authorize the entity listed below to release/disclose the PHI below to:   [facility]/Nicole Stevenson P.A.-C. and Nicole Stevenson P.A.-C.   Provider or Entity Name:   [facility]     Address   City, State, [facility]   Phone:      Fax:     Reason for request: continuity of care   Information to be released:    [  ] LAST COLONOSCOPY,  including any PATH REPORT and follow-up  [  ] LAST FIT/COLOGUARD RESULT [  ] LAST DEXA  [  ] LAST MAMMOGRAM  [  ] LAST PAP  [  ] LAST LABS [  ] RETINA EXAM REPORT  [  ] IMMUNIZATION RECORDS  [x ] Release all info      [  ] Check here and initial the line next to each item to release ALL health information INCLUDING  _____ Care and treatment for drug and / or alcohol abuse  _____ HIV testing, infection status, or AIDS  _____ Genetic Testing    DATES OF SERVICE OR TIME PERIOD TO BE DISCLOSED: _____________  I understand and acknowledge that:  * This Authorization may be revoked at any time by you in writing, except if your health information has already been used or disclosed.  * Your health information that will be used or disclosed as a result of you signing this authorization could be re-disclosed by the recipient. If this occurs, your re-disclosed health information may no longer be protected by State or Federal laws.  * You may refuse to sign this Authorization. Your refusal will not affect your ability to obtain treatment.  * This Authorization becomes effective upon signing and will  on (date) __________.      If no date is indicated, this Authorization will  one (1) year from the signature date.    Name: Mateo MARISOL  Gilbert    Signature:   Date:     9/10/2020       PLEASE FAX REQUESTED RECORDS BACK TO: [phone]

## 2020-09-11 LAB — LDLC SERPL-MCNC: 80 MG/DL (ref 0–129)

## 2020-10-06 ENCOUNTER — APPOINTMENT (OUTPATIENT)
Dept: CARDIOLOGY | Facility: PHYSICIAN GROUP | Age: 65
End: 2020-10-06
Payer: MEDICARE

## 2020-11-13 ENCOUNTER — TELEPHONE (OUTPATIENT)
Dept: CARDIOLOGY | Facility: MEDICAL CENTER | Age: 65
End: 2020-11-13

## 2020-11-13 ENCOUNTER — OFFICE VISIT (OUTPATIENT)
Dept: CARDIOLOGY | Facility: PHYSICIAN GROUP | Age: 65
End: 2020-11-13
Payer: MEDICARE

## 2020-11-13 VITALS
WEIGHT: 233 LBS | RESPIRATION RATE: 16 BRPM | HEART RATE: 70 BPM | HEIGHT: 73 IN | BODY MASS INDEX: 30.88 KG/M2 | SYSTOLIC BLOOD PRESSURE: 182 MMHG | DIASTOLIC BLOOD PRESSURE: 106 MMHG | OXYGEN SATURATION: 92 %

## 2020-11-13 DIAGNOSIS — I49.3 PVC (PREMATURE VENTRICULAR CONTRACTION): ICD-10-CM

## 2020-11-13 DIAGNOSIS — E66.9 OBESITY (BMI 30-39.9): ICD-10-CM

## 2020-11-13 DIAGNOSIS — R93.1 LEFT VENTRICULAR EJECTION FRACTION LESS THAN 40%: ICD-10-CM

## 2020-11-13 DIAGNOSIS — E78.49 OTHER HYPERLIPIDEMIA: ICD-10-CM

## 2020-11-13 DIAGNOSIS — I10 ESSENTIAL HYPERTENSION: ICD-10-CM

## 2020-11-13 DIAGNOSIS — I50.43 ACUTE ON CHRONIC COMBINED SYSTOLIC AND DIASTOLIC CONGESTIVE HEART FAILURE (HCC): ICD-10-CM

## 2020-11-13 PROCEDURE — 99214 OFFICE O/P EST MOD 30 MIN: CPT | Performed by: INTERNAL MEDICINE

## 2020-11-13 RX ORDER — LISINOPRIL 10 MG/1
10 TABLET ORAL 2 TIMES DAILY
Qty: 180 TAB | Refills: 3 | Status: SHIPPED | OUTPATIENT
Start: 2020-11-13

## 2020-11-13 RX ORDER — ROSUVASTATIN CALCIUM 40 MG/1
40 TABLET, COATED ORAL EVERY EVENING
Qty: 90 TAB | Refills: 3 | Status: SHIPPED | OUTPATIENT
Start: 2020-11-13 | End: 2021-04-28 | Stop reason: SDUPTHER

## 2020-11-13 RX ORDER — CARVEDILOL 25 MG/1
25 TABLET ORAL 2 TIMES DAILY WITH MEALS
Qty: 180 TAB | Refills: 3 | Status: SHIPPED | OUTPATIENT
Start: 2020-11-13 | End: 2020-11-24 | Stop reason: SINTOL

## 2020-11-13 ASSESSMENT — ENCOUNTER SYMPTOMS
COUGH: 0
SORE THROAT: 0
NAUSEA: 0
BRUISES/BLEEDS EASILY: 0
FEVER: 0
FOCAL WEAKNESS: 0
DIZZINESS: 0
CHILLS: 0
FALLS: 0
PALPITATIONS: 0
SHORTNESS OF BREATH: 1
CLAUDICATION: 0
WEAKNESS: 0
BLURRED VISION: 0
ABDOMINAL PAIN: 0
PND: 0

## 2020-11-13 ASSESSMENT — FIBROSIS 4 INDEX: FIB4 SCORE: 1.54

## 2020-11-13 NOTE — PROGRESS NOTES
Chief Complaint   Patient presents with   • Congestive Heart Failure       Subjective:   Mateo Hunt is a 65 y.o. male who presents today in consultation from Nicole Stevenson for evaluation of his history of reduced ejection fraction in 2017 he was in the hospital for COPD exacerbation it was noted to have elevated BNP but normal troponin so an echocardiogram which shows a reduced ejection fraction likely 40% with some mild valvular issues he was treated for hypertension and diabetes he is a retired  and has been tolerating his medications okay but still struggles with shortness of breath he stopped smoking last year he has been checking his blood pressures at home blood pressure readings often in the 150s sometimes higher in the office like today but he is interested in improving his health and regular follow-up lives in [place]    Past Medical History:   Diagnosis Date   • Hypertension      History reviewed. No pertinent surgical history.  Family History   Problem Relation Age of Onset   • Heart Disease Father 65        CABG   • Heart Disease Paternal Uncle      Social History     Socioeconomic History   • Marital status:      Spouse name: Not on file   • Number of children: Not on file   • Years of education: Not on file   • Highest education level: Not on file   Occupational History   • Not on file   Social Needs   • Financial resource strain: Not on file   • Food insecurity     Worry: Not on file     Inability: Not on file   • Transportation needs     Medical: Not on file     Non-medical: Not on file   Tobacco Use   • Smoking status: Former Smoker     Packs/day: 0.75     Years: 45.00     Pack years: 33.75     Types: Cigarettes     Quit date: 2/10/2019     Years since quittin.7   • Smokeless tobacco: Never Used   • Tobacco comment: 4-5 cigarettes/day    Substance and Sexual Activity   • Alcohol use: Yes     Alcohol/week: 4.8 oz     Types: 8 Cans of beer per week     Frequency:  2-3 times a week     Comment: 6-8 beers a week    • Drug use: No   • Sexual activity: Yes     Partners: Female   Lifestyle   • Physical activity     Days per week: Not on file     Minutes per session: Not on file   • Stress: Not on file   Relationships   • Social connections     Talks on phone: Not on file     Gets together: Not on file     Attends Cheondoism service: Not on file     Active member of club or organization: Not on file     Attends meetings of clubs or organizations: Not on file     Relationship status: Not on file   • Intimate partner violence     Fear of current or ex partner: Not on file     Emotionally abused: Not on file     Physically abused: Not on file     Forced sexual activity: Not on file   Other Topics Concern   • Not on file   Social History Narrative   • Not on file     No Known Allergies  Outpatient Encounter Medications as of 11/13/2020   Medication Sig Dispense Refill   • rosuvastatin (CRESTOR) 40 MG tablet Take 1 Tab by mouth every evening. 90 Tab 3   • carvedilol (COREG) 25 MG Tab Take 1 Tab by mouth 2 times a day, with meals. 180 Tab 3   • lisinopril (PRINIVIL) 10 MG Tab Take 1 Tab by mouth 2 times a day. 180 Tab 3   • ipratropium-albuterol (DUONEB) 0.5-2.5 (3) MG/3ML nebulizer solution 3 mL by Nebulization route every four hours as needed for Shortness of Breath. 300 mL 5   • metformin (GLUCOPHAGE) 1000 MG tablet Take 1 Tab by mouth 2 times a day, with meals. 180 Tab 3   • ropinirole (REQUIP) 5 MG tablet Take 1 Tab by mouth every day. 90 Tab 1   • vitamin D, Ergocalciferol, (DRISDOL) 1.25 MG (33594 UT) Cap capsule Take 1 Cap by mouth every 7 days. 15 Cap 2   • potassium chloride SA (KDUR) 20 MEQ Tab CR Take 1 Tab by mouth 2 Times a Day. 180 Tab 1   • aspirin (ASA) 81 MG Chew Tab chewable tablet Take 1 Tab by mouth every day. 200 Tab 1   • albuterol 108 (90 Base) MCG/ACT Aero Soln inhalation aerosol Inhale 2 Puffs by mouth 2 times a day as needed for Shortness of Breath. 17 g 5   •  "ipratropium-albuterol (COMBIVENT RESPIMAT)  MCG/ACT Aero Soln Inhale 1 Puff by mouth 4 times a day. 12 g 1   • Semaglutide, 1 MG/DOSE, 2 MG/1.5ML Solution Pen-injector Inject 1 mg as instructed every 7 days. 6 PEN 0   • Insulin Pen Needle (PEN NEEDLES) 32G X 4 MM Misc 1 Each by Does not apply route every day. 18 Each 3   • [DISCONTINUED] metoprolol (LOPRESSOR) 50 MG Tab Take 1 Tab by mouth 2 times a day. 180 Tab 1   • [DISCONTINUED] pravastatin (PRAVACHOL) 40 MG tablet Take 1 Tab by mouth every day. (Patient taking differently: Take 20 mg by mouth every day.) 90 Tab 1   • [DISCONTINUED] lisinopril (PRINIVIL) 10 MG Tab Take 1 Tab by mouth every day. 90 Tab 1   • furosemide (LASIX) 40 MG Tab Take 1 Tab by mouth every day. (Patient not taking: Reported on 11/13/2020) 90 Tab 1   • glucose blood strip 1 Strip by Other route every day. (Patient not taking: Reported on 11/13/2020) 100 Strip 1     No facility-administered encounter medications on file as of 11/13/2020.      Review of Systems   Constitutional: Negative for chills and fever.   HENT: Positive for tinnitus. Negative for sore throat.    Eyes: Negative for blurred vision.   Respiratory: Positive for shortness of breath. Negative for cough.    Cardiovascular: Negative for chest pain, palpitations, claudication, leg swelling and PND.   Gastrointestinal: Negative for abdominal pain and nausea.   Musculoskeletal: Negative for falls and joint pain.   Skin: Negative for rash.   Neurological: Negative for dizziness, focal weakness and weakness.   Endo/Heme/Allergies: Does not bruise/bleed easily.        Objective:   BP (!) 182/106 (BP Location: Left arm, Patient Position: Sitting, BP Cuff Size: Adult)   Pulse 70   Resp 16   Ht 1.854 m (6' 1\")   Wt 105.7 kg (233 lb)   SpO2 92%   BMI 30.74 kg/m²     Physical Exam   Constitutional: No distress.   HENT:   Patient wearing a mask due to COVID precautions   Eyes: No scleral icterus.   Neck: No JVD present. "   Cardiovascular: Normal rate and normal heart sounds. Exam reveals no gallop and no friction rub.   No murmur heard.  Pulmonary/Chest: No respiratory distress. He has no wheezes. He has no rales.   Abdominal: Soft. Bowel sounds are normal.   Musculoskeletal:         General: No edema.   Neurological: He is alert.   Skin: No rash noted. He is not diaphoretic.   Psychiatric: He has a normal mood and affect.     Recent EKG shows sinus rhythm with PVCs    We reviewed in person the most recent labs  Recent Results (from the past 4032 hour(s))   CBC WITHOUT DIFFERENTIAL    Collection Time: 08/25/20  7:15 AM   Result Value Ref Range    WBC 9.8 4.8 - 10.8 K/uL    RBC 5.28 4.70 - 6.10 M/uL    Hemoglobin 17.0 14.0 - 18.0 g/dL    Hematocrit 50.7 42.0 - 52.0 %    MCV 96.0 81.4 - 97.8 fL    MCH 32.2 27.0 - 33.0 pg    MCHC 33.5 (L) 33.7 - 35.3 g/dL    RDW 42.8 35.9 - 50.0 fL    Platelet Count 216 164 - 446 K/uL    MPV 11.3 9.0 - 12.9 fL   FREE THYROXINE    Collection Time: 08/25/20  7:15 AM   Result Value Ref Range    Free T-4 0.98 0.93 - 1.70 ng/dL   VITAMIN D,25 HYDROXY    Collection Time: 08/25/20  7:15 AM   Result Value Ref Range    25-Hydroxy   Vitamin D 25 25 (L) 30 - 100 ng/mL   HEMOGLOBIN A1C    Collection Time: 08/25/20  7:15 AM   Result Value Ref Range    Glycohemoglobin 8.6 (H) 0.0 - 5.6 %    Est Avg Glucose 200 mg/dL   TSH    Collection Time: 08/25/20  7:15 AM   Result Value Ref Range    TSH 2.070 0.380 - 5.330 uIU/mL   Comp Metabolic Panel    Collection Time: 08/25/20  7:15 AM   Result Value Ref Range    Sodium 137 135 - 145 mmol/L    Potassium 4.3 3.6 - 5.5 mmol/L    Chloride 99 96 - 112 mmol/L    Co2 27 20 - 33 mmol/L    Anion Gap 11.0 7.0 - 16.0    Glucose 200 (H) 65 - 99 mg/dL    Bun 12 8 - 22 mg/dL    Creatinine 1.11 0.50 - 1.40 mg/dL    Calcium 9.3 8.5 - 10.5 mg/dL    AST(SGOT) 36 12 - 45 U/L    ALT(SGPT) 47 2 - 50 U/L    Alkaline Phosphatase 121 (H) 30 - 99 U/L    Total Bilirubin 0.4 0.1 - 1.5 mg/dL     Albumin 3.9 3.2 - 4.9 g/dL    Total Protein 6.9 6.0 - 8.2 g/dL    Globulin 3.0 1.9 - 3.5 g/dL    A-G Ratio 1.3 g/dL   Lipid Profile    Collection Time: 08/25/20  7:15 AM   Result Value Ref Range    Cholesterol,Tot 170 100 - 199 mg/dL    Triglycerides 428 (H) 0 - 149 mg/dL    HDL 32 (A) >=40 mg/dL    LDL see below <100 mg/dL   ESTIMATED GFR    Collection Time: 08/25/20  7:15 AM   Result Value Ref Range    GFR If African American >60 >60 mL/min/1.73 m 2    GFR If Non African American >60 >60 mL/min/1.73 m 2   LDL, DIRECT    Collection Time: 09/09/20  7:05 AM   Result Value Ref Range    LDL Direct 80 0 - 129 mg/dL   HEMOGLOBIN A1C    Collection Time: 09/09/20  7:05 AM   Result Value Ref Range    Glycohemoglobin 8.2 (H) 0.0 - 5.6 %    Est Avg Glucose 189 mg/dL   Comp Metabolic Panel    Collection Time: 09/09/20  7:05 AM   Result Value Ref Range    Sodium 139 135 - 145 mmol/L    Potassium 4.5 3.6 - 5.5 mmol/L    Chloride 99 96 - 112 mmol/L    Co2 26 20 - 33 mmol/L    Anion Gap 14.0 7.0 - 16.0    Glucose 148 (H) 65 - 99 mg/dL    Bun 15 8 - 22 mg/dL    Creatinine 1.02 0.50 - 1.40 mg/dL    Calcium 9.3 8.5 - 10.5 mg/dL    AST(SGOT) 25 12 - 45 U/L    ALT(SGPT) 24 2 - 50 U/L    Alkaline Phosphatase 93 30 - 99 U/L    Total Bilirubin 0.6 0.1 - 1.5 mg/dL    Albumin 4.0 3.2 - 4.9 g/dL    Total Protein 7.0 6.0 - 8.2 g/dL    Globulin 3.0 1.9 - 3.5 g/dL    A-G Ratio 1.3 g/dL   Lipid Profile    Collection Time: 09/09/20  7:05 AM   Result Value Ref Range    Cholesterol,Tot 137 100 - 199 mg/dL    Triglycerides 209 (H) 0 - 149 mg/dL    HDL 34 (A) >=40 mg/dL    LDL 61 <100 mg/dL   ESTIMATED GFR    Collection Time: 09/09/20  7:05 AM   Result Value Ref Range    GFR If African American >60 >60 mL/min/1.73 m 2    GFR If Non African American >60 >60 mL/min/1.73 m 2     Personally reviewed his images from his echocardiogram as report is a little bit inconsistent shows normal function but he indeed has global hypokinesis with an EF of 40%  perhaps even lower    Assessment:     1. Left ventricular ejection fraction less than 40%  EC-ECHOCARDIOGRAM COMPLETE W/O CONT    NM-CARDIAC STRESS TEST   2. PVC (premature ventricular contraction)  lisinopril (PRINIVIL) 10 MG Tab    EC-ECHOCARDIOGRAM COMPLETE W/O CONT    NM-CARDIAC STRESS TEST   3. Other hyperlipidemia     4. Obesity (BMI 30-39.9)     5. Acute on chronic combined systolic and diastolic congestive heart failure (HCC)  lisinopril (PRINIVIL) 10 MG Tab   6. Essential hypertension  lisinopril (PRINIVIL) 10 MG Tab       Medical Decision Making:  Today's Assessment / Status / Plan:     It was my pleasure to meet with Mr. Hunt.    Blood pressure is not well controlled.  We specifically assessed the labs on hypertension treatment  I had him switch his metoprolol to carvedilol, increase his lisinopril to 10 twice daily and monitor depending on his echo result may need Entresto and spironolactone    We discussed that you should talk with primary care (or endocrinology) about oral medication: empagliflozin (JARDIANCE®  Preferred for CAD), dapagliflozin (FARXIGA®, preferred for CHF),  there are cardiovascular benefits but there are also risks.  You may also want to consider liraglutide (Victoza®), semaglutide (Ozempic®), dulaglutide (Trulicity®) which are injection with cardiovascular benefits but also risks.    We will have him maximize his statin to rosuvastatin    We will check his echocardiogram and stress test with reduced ejection fraction    I will see Mr. Hunt back in 1 year time and encouraged him to follow up with us over the phone or electronically using my MyChart as issues arise.    It is my pleasure to participate in the care of Mr. Hunt.  Please do not hesitate to contact me with questions or concerns.    Aurelio Hargrove MD PhD FAC  Cardiologist [facility]    Please note that this dictation was created using voice recognition software. There  may be errors I did not discover before finalizing the note.

## 2020-11-13 NOTE — TELEPHONE ENCOUNTER
MAHAMED RUELAS (Key: [id number]) - [id number]  Rosuvastatin Calcium 40MG tablets  Status: Sent to Plan  Created: November 13th, 2020 [phone]  Sent: November 13th, 2020  Open  Julio as not sent  Archive

## 2020-11-13 NOTE — PATIENT INSTRUCTIONS
We discussed that you should talk with primary care (or endocrinology) about oral medication: empagliflozin (JARDIANCE®  Preferred for CAD), dapagliflozin (FARXIGA®, preferred for CHF),  there are cardiovascular benefits but there are also risks.  You may also want to consider liraglutide (Victoza®), semaglutide (Ozempic®), dulaglutide (Trulicity®) which are injection with cardiovascular benefits but also risks.      A - Aspirin - Aspirin 81 mg daily or other antiplatelets (clopidogrel (PLAVIX), prasrugrel (EFFIENT), ticagrelor (BRILINTA)) reduce your risk   B - Blood Pressure Control - reduces your risk  C - Cholesterol Management - statins reduce your risk, for those that are intolerant to statins there are alternatives  D - Diet - Cardiac rehab diets, Cardiosmart.org  E - Exercise - at least 5 hours of moderate exercise weekly  (typical brisk walking or similar activity)  F - Fats - VASCEPA,  or Fish oil (if Vascepa too expensive) for elevated triglycerides (REDUCE IT trial showed reduction from 22% 5 year MACE to 17%).  G - Good Vibes - meditation, exercise, yoga, mindfulness, stress reduction  H - Heart Failure - betablockers, sucubatril (ENTRESTO) 16% less risk of dying over 3 years, spironolactone, dapagliflozin (FARXIGA) 17% less risk of dying over 2 years, CRT +/- ICD  R - Rehab - Cardiac rehab reduces risk of dying by 13-24% and need to go to the hospital by 30% within the first year  S - Smoking - never smoke, if you do smoke ask for help to quit for good  T - Type II Diabetes - pills empagliflozin (JARDIANCE) 38% less risk of dying over 4 years, and/or weekly injections liraglutide (Victoza), semaglutide (Ozempic), dulaglutide (Trulicity) ~26% less risk of MACE in 2 years  W - Weight - maintain a healthy weight    Work on at least 1.5 - 5 hours a week of moderate exercise    Please look into the following diets and incorporate them into your diet  LOW SALT DIET   KEEP YOUR SODIUM EQUAL TO CALORIES AND NO  MORE THAN DOUBLE THE CALORIES FOR A LOW SALT DIET    Cardiosmart.org - great resource for American College of Cardiology on heart disease prevention and treatment    FOR TREATMENT OR PREVENTION OF CORONARY ARTERY DISEASE  These three programs are approved by Medicare/Insurers for those with heart disease  [facility] - [facility]  Dr. Gross's Program for Reversing Heart Disease - Vinayak Schaffer's Cardiologist vegetarian-based  [facility] - [place]-based mind-body Program    This is a commonly referenced Program  Dr Preston - [employer] over [employer] (book and documentary) - vegetarian-based    FOR TREATMENT OF BLOOD PRESSURE  DASH DIET - American Heart Association for treatment of HYPERTENSION    FOR TREATMENT OF BAD CHOLESTEROL/FATS  REDUCE PROCESSED SUGAR AS MUCH AS POSSIBLE  INCREASE WHOLE GRAINS/VEGETABLES  INCREASE FIBER    Lowering total cholesterol and LDL (bad) cholesterol:  - Eat leaner cuts of meat, or eliminate altogether if possible red meat, and frequently substitute fish or chicken.  - Limit saturated fat to no more than 7-10% of total calories no more than 10 g per day is recommended. Some sources of saturated fat include butter, animal fats, hydrogenated vegetable fats and oils, many desserts, whole milk dairy products.  - Replaced saturated fats with polyunsaturated fats and monounsaturated fats. Foods high in monounsaturated fat include nuts, canola oil, avocados, and olives.  - Limit trans fat (processed foods) and replaced with fresh fruits and vegetables  - Recommend nonfat dairy products  - Increase substantially the amount of soluble fiber intake (legumes such as beans, fruit, whole grains).  - Consider nutritional supplements: plant sterile spreads such as Benecol, fish oil,  flaxseed oil, omega-3 acids capsules 1000 mg twice a day, or viscous fiber such as Metamucil  - Attain ideal weight and regular exercise (at least 30 minutes per day of  moderate exercise)  ASK ABOUT STATIN OR NON STATIN MEDICATION TO REDUCE YOUR LDL AND HEART RISK    Lowering triglycerides:  - Reduce intake of simple sugar: Desserts, candy, pastries, honey, sodas, sugared cereals, yogurt, Gatorade, sports bars, canned fruit, smoothies, fruit juice, coffee drinks  - Reduced intake of refined starches: Refined Pasta, most bread  - Reduce or abstain from alcohol  - Increase omega-3 fatty acids: [place], Trout, Mackerel, Herring, Alb[place] tuna and supplements  - Attain ideal weight and regular exercise (at least 30 minutes per day of moderate exercise)  ASK ABOUT PURIFIED OMEGA 3 EPA or FISH OIL TO REDUCE YOUR TG AND HEART RISK    Elevating HDL (good) cholesterol:  - Increase physical activity  - Increase omega-3 fatty acids and supplements as listed above  - Incorporating appropriate amounts of monounsaturated fats such as nuts, olive oil, canola oil, avocados, olives  - Stop smoking  - Attain ideal weight and regular exercise (at least 30 minutes per day of moderate exercise)

## 2020-11-17 NOTE — TELEPHONE ENCOUNTER
Rosuvastatin 40 mg has been denied by insurance. Patient has to have tried and failed two formulary alternatives before trying a non-formulary medication. Patient has tried Pravastatin before.unless there is justification that Rosuvastatin is better treatment for a specific reason.   To  nurse to advise.   Letter sent to scan

## 2020-11-18 NOTE — TELEPHONE ENCOUNTER
Called pt, unable to reach.  Left detailed voicemail regarding findings and MD recommendations; encourage pt to return this call at his earliest convenience.    Notification sent to CW's covering RN for 11/18/2020

## 2020-11-24 ENCOUNTER — TELEPHONE (OUTPATIENT)
Dept: CARDIOLOGY | Facility: MEDICAL CENTER | Age: 65
End: 2020-11-24

## 2020-11-24 DIAGNOSIS — I10 ESSENTIAL HYPERTENSION: ICD-10-CM

## 2020-11-24 RX ORDER — METOPROLOL TARTRATE 50 MG/1
50 TABLET, FILM COATED ORAL 2 TIMES DAILY
Qty: 180 TAB | Refills: 1 | Status: SHIPPED | OUTPATIENT
Start: 2020-11-24

## 2020-11-24 NOTE — TELEPHONE ENCOUNTER
"Returned pt call and reviewed concerns.  He states, \"It's been since I got the medicine on the 13th.  My nebulizer which eased the feeling of not being able to catch my breath and I can go to sleep.\"  Pt states SOB doesn't worsen when moving or sitting, and it is constant.  Denies pain but states he's \"a little dizziness and slightly lightheaded.\"  BP range: 156-164/81-85 HR: 76-85.  Pt is needing to take breaths in between words during conversation.  Informed pt his symptoms may be correlated to COPD exacerbation.  ED/EMS precautions noted for worsening symptoms.  Pt states no other concerns or question.      Pt concerns relayed to MD for advise.  "

## 2020-11-24 NOTE — TELEPHONE ENCOUNTER
"CW    Pt calling in regards to a new Rx carvedilol (COREG) 25 MG Tab. Pt states that ever since he started taking it he has had a hard time breathing. Pt states \"it makes me feel like im not getting the air I need\"   Pt says he is having to do breathing treatments and they are helping but he hasnt had to do this before and is worried. Pt also states \"im almost scared to go to sleep at night.\" Please call pt back at [phone].     Thank you   "

## 2020-11-25 NOTE — TELEPHONE ENCOUNTER
Could be his carvedilol flairing up his reactive airway disease, he should go back to metoprolol and let us know if the breathing is improved.  Also make sure he hasn't gained weight.  Still waiting on the testing to see if there is anything else to worry about.

## 2020-11-25 NOTE — TELEPHONE ENCOUNTER
Returned pt call and reviewed MD recommendations.  Upon chart review, pt is not scheduled for testing.  Reassurance given that DL, PAR will call pt to schedule testing.  He verbalizes understanding and states no other concerns or questions at this time.  Pt is appreciative of information given.    Task deferred to DL to schedule testing.  Medication sent to preferred pharmacy.

## 2021-01-22 DIAGNOSIS — G25.81 RESTLESS LEG SYNDROME, CONTROLLED: ICD-10-CM

## 2021-01-25 RX ORDER — ROPINIROLE 5 MG/1
TABLET, FILM COATED ORAL
Qty: 90 TAB | Refills: 0 | Status: SHIPPED | OUTPATIENT
Start: 2021-01-25 | End: 2021-03-16

## 2021-01-25 NOTE — TELEPHONE ENCOUNTER
Was the patient seen in the last year in this department? Yes    Does patient have an active prescription for medications requested? Yes    Received Request Via: Pharmacy    Hospital Outpatient Visit on 09/09/2020   Component Date Value   • LDL Direct 09/09/2020 80    • Glycohemoglobin 09/09/2020 8.2*   • Est Avg Glucose 09/09/2020 189    • Sodium 09/09/2020 139    • Potassium 09/09/2020 4.5    • Chloride 09/09/2020 99    • Co2 09/09/2020 26    • Anion Gap 09/09/2020 14.0    • Glucose 09/09/2020 148*   • Bun 09/09/2020 15    • Creatinine 09/09/2020 1.02    • Calcium 09/09/2020 9.3    • AST(SGOT) 09/09/2020 25    • ALT(SGPT) 09/09/2020 24    • Alkaline Phosphatase 09/09/2020 93    • Total Bilirubin 09/09/2020 0.6    • Albumin 09/09/2020 4.0    • Total Protein 09/09/2020 7.0    • Globulin 09/09/2020 3.0    • A-G Ratio 09/09/2020 1.3    • Cholesterol,Tot 09/09/2020 137    • Triglycerides 09/09/2020 209*   • HDL 09/09/2020 34*   • LDL 09/09/2020 61    • GFR If  09/09/2020 >60    • GFR If Non  Ameri* 09/09/2020 >60    Hospital Outpatient Visit on 08/25/2020   Component Date Value   • Free T-4 08/25/2020 0.98    • 25-Hydroxy   Vitamin D 25 08/25/2020 25*   • Glycohemoglobin 08/25/2020 8.6*   • Est Avg Glucose 08/25/2020 200    • TSH 08/25/2020 2.070    • Sodium 08/25/2020 137    • Potassium 08/25/2020 4.3    • Chloride 08/25/2020 99    • Co2 08/25/2020 27    • Anion Gap 08/25/2020 11.0    • Glucose 08/25/2020 200*   • Bun 08/25/2020 12    • Creatinine 08/25/2020 1.11    • Calcium 08/25/2020 9.3    • AST(SGOT) 08/25/2020 36    • ALT(SGPT) 08/25/2020 47    • Alkaline Phosphatase 08/25/2020 121*   • Total Bilirubin 08/25/2020 0.4    • Albumin 08/25/2020 3.9    • Total Protein 08/25/2020 6.9    • Globulin 08/25/2020 3.0    • A-G Ratio 08/25/2020 1.3    • Cholesterol,Tot 08/25/2020 170    • Triglycerides 08/25/2020 428*   • HDL 08/25/2020 32*   • LDL 08/25/2020 see below    • GFR If   08/25/2020 >60    • GFR If Non  Ameri* 08/25/2020 >60    Hospital Outpatient Visit on 08/25/2020   Component Date Value   • WBC 08/25/2020 9.8    • RBC 08/25/2020 5.28    • Hemoglobin 08/25/2020 17.0    • Hematocrit 08/25/2020 50.7    • MCV 08/25/2020 96.0    • MCH 08/25/2020 32.2    • MCHC 08/25/2020 33.5*   • RDW 08/25/2020 42.8    • Platelet Count 08/25/2020 216    • MPV 08/25/2020 11.3    Hospital Outpatient Visit on 03/11/2020   Component Date Value   • Creatinine, Urine 03/11/2020 27.90    • Microalbumin, Urine Rand* 03/11/2020 <0.7    • Micro Alb Creat Ratio 03/11/2020 see below    Office Visit on 03/11/2020   Component Date Value   • Glycohemoglobin 03/11/2020 7.5*   ]

## 2021-04-26 ENCOUNTER — NON-PROVIDER VISIT (OUTPATIENT)
Dept: MEDICAL GROUP | Facility: CLINIC | Age: 66
End: 2021-04-26
Payer: MEDICARE

## 2021-04-26 ENCOUNTER — HOSPITAL ENCOUNTER (OUTPATIENT)
Facility: MEDICAL CENTER | Age: 66
End: 2021-04-26
Attending: PHYSICIAN ASSISTANT
Payer: MEDICARE

## 2021-04-26 PROCEDURE — 80053 COMPREHEN METABOLIC PANEL: CPT

## 2021-04-26 PROCEDURE — 80061 LIPID PANEL: CPT

## 2021-04-26 PROCEDURE — 36415 COLL VENOUS BLD VENIPUNCTURE: CPT | Performed by: PHYSICIAN ASSISTANT

## 2021-04-27 DIAGNOSIS — E11.9 TYPE 2 DIABETES MELLITUS WITHOUT COMPLICATION, WITHOUT LONG-TERM CURRENT USE OF INSULIN (HCC): ICD-10-CM

## 2021-04-27 DIAGNOSIS — E78.49 OTHER HYPERLIPIDEMIA: ICD-10-CM

## 2021-04-27 LAB
ALBUMIN SERPL BCP-MCNC: 4.3 G/DL (ref 3.2–4.9)
ALBUMIN/GLOB SERPL: 1.2 G/DL
ALP SERPL-CCNC: 111 U/L (ref 30–99)
ALT SERPL-CCNC: 29 U/L (ref 2–50)
ANION GAP SERPL CALC-SCNC: 8 MMOL/L (ref 7–16)
AST SERPL-CCNC: 33 U/L (ref 12–45)
BILIRUB SERPL-MCNC: 0.6 MG/DL (ref 0.1–1.5)
BUN SERPL-MCNC: 21 MG/DL (ref 8–22)
CALCIUM SERPL-MCNC: 9.9 MG/DL (ref 8.5–10.5)
CHLORIDE SERPL-SCNC: 97 MMOL/L (ref 96–112)
CHOLEST SERPL-MCNC: 150 MG/DL (ref 100–199)
CO2 SERPL-SCNC: 29 MMOL/L (ref 20–33)
CREAT SERPL-MCNC: 1.19 MG/DL (ref 0.5–1.4)
GLOBULIN SER CALC-MCNC: 3.5 G/DL (ref 1.9–3.5)
GLUCOSE SERPL-MCNC: 87 MG/DL (ref 65–99)
HDLC SERPL-MCNC: 33 MG/DL
LDLC SERPL CALC-MCNC: 93 MG/DL
POTASSIUM SERPL-SCNC: 4.2 MMOL/L (ref 3.6–5.5)
PROT SERPL-MCNC: 7.8 G/DL (ref 6–8.2)
SODIUM SERPL-SCNC: 134 MMOL/L (ref 135–145)
TRIGL SERPL-MCNC: 118 MG/DL (ref 0–149)

## 2021-04-28 ENCOUNTER — OFFICE VISIT (OUTPATIENT)
Dept: MEDICAL GROUP | Facility: CLINIC | Age: 66
End: 2021-04-28
Payer: MEDICARE

## 2021-04-28 VITALS
SYSTOLIC BLOOD PRESSURE: 140 MMHG | RESPIRATION RATE: 16 BRPM | WEIGHT: 220 LBS | BODY MASS INDEX: 29.16 KG/M2 | OXYGEN SATURATION: 92 % | HEIGHT: 73 IN | HEART RATE: 75 BPM | DIASTOLIC BLOOD PRESSURE: 82 MMHG | TEMPERATURE: 97.2 F

## 2021-04-28 DIAGNOSIS — R06.02 SHORTNESS OF BREATH: ICD-10-CM

## 2021-04-28 DIAGNOSIS — U07.1 COVID-19: ICD-10-CM

## 2021-04-28 DIAGNOSIS — E11.9 TYPE 2 DIABETES MELLITUS WITHOUT COMPLICATION, WITHOUT LONG-TERM CURRENT USE OF INSULIN (HCC): ICD-10-CM

## 2021-04-28 DIAGNOSIS — G25.81 RESTLESS LEG SYNDROME, CONTROLLED: ICD-10-CM

## 2021-04-28 DIAGNOSIS — I10 ESSENTIAL HYPERTENSION: ICD-10-CM

## 2021-04-28 DIAGNOSIS — E66.9 OBESITY (BMI 30-39.9): ICD-10-CM

## 2021-04-28 DIAGNOSIS — J44.1 COPD WITH ACUTE EXACERBATION (HCC): ICD-10-CM

## 2021-04-28 DIAGNOSIS — E11.59 TYPE 2 DIABETES MELLITUS WITH OTHER CIRCULATORY COMPLICATION, WITHOUT LONG-TERM CURRENT USE OF INSULIN (HCC): ICD-10-CM

## 2021-04-28 DIAGNOSIS — E78.49 OTHER HYPERLIPIDEMIA: ICD-10-CM

## 2021-04-28 DIAGNOSIS — I50.43 ACUTE ON CHRONIC COMBINED SYSTOLIC AND DIASTOLIC CONGESTIVE HEART FAILURE (HCC): ICD-10-CM

## 2021-04-28 PROCEDURE — 99214 OFFICE O/P EST MOD 30 MIN: CPT | Mod: CS | Performed by: PHYSICIAN ASSISTANT

## 2021-04-28 RX ORDER — ROSUVASTATIN CALCIUM 40 MG/1
40 TABLET, COATED ORAL EVERY EVENING
Qty: 90 TABLET | Refills: 3 | Status: SHIPPED | OUTPATIENT
Start: 2021-04-28

## 2021-04-28 RX ORDER — ROPINIROLE 6 MG/1
1 TABLET, FILM COATED, EXTENDED RELEASE ORAL NIGHTLY PRN
Qty: 90 TABLET | Refills: 1 | Status: SHIPPED | OUTPATIENT
Start: 2021-04-28 | End: 2021-06-08 | Stop reason: CLARIF

## 2021-04-28 RX ORDER — SEMAGLUTIDE 1.34 MG/ML
INJECTION, SOLUTION SUBCUTANEOUS
Qty: 6 ML | Refills: 2 | Status: SHIPPED | OUTPATIENT
Start: 2021-04-28 | End: 2021-06-08 | Stop reason: SDUPTHER

## 2021-04-28 ASSESSMENT — FIBROSIS 4 INDEX: FIB4 SCORE: 1.84

## 2021-04-28 ASSESSMENT — PATIENT HEALTH QUESTIONNAIRE - PHQ9: CLINICAL INTERPRETATION OF PHQ2 SCORE: 0

## 2021-04-28 NOTE — PROGRESS NOTES
cc:  Shortness of breath    Subjective:     Mateo Hunt is a 65 y.o. male presenting for shortness of breath    Patient does have a history of type 2 diabetes without long-term use of insulin.  He also has hyperlipidemia, hypertension and obesity.  He did have lab work before coming in to be seen today.  He is here to discuss those results.  He does indicate that he is due for refills of his Ozempic at this time.  He is also needing a refill of his Crestor as he is out of his cholesterol medication.    Patient presents to the office for shortness of breath.  He states that it is not every day, but most days.  He states that he has to stop half way to his destination and it comes on quickly.  He states that he was to see his cardiologist but with his covid, he had to delay the appointment and he will now be seen on the 9th.  He states that he feels like he is needing the nebulizer frequently.  He states that he now uses oxygen in the night or if he is winded in the day.  Patient does have COPD and shortness of breath.  He also has a known systolic and diastolic congestive heart failure.  With his recent Covid illness, concern is how this may have affected his lung function as well as his heart.  He does have an upcoming appointment with cardiology June ninth.    Patient feels that the medication for his restless leg is not strong enough at this time.  He is asking for an increase in his dosage.  He is currently taking 5 mg.        Review of systems:  See above.   Denies any symptoms unless previously indicated.        Current Outpatient Medications:   •  fluticasone-salmeterol (ADVAIR) 250-50 MCG/DOSE AEROSOL POWDER, BREATH ACTIVATED, Inhale 1 Puff every 12 hours., Disp: 1 Each, Rfl: 2  •  Semaglutide, 1 MG/DOSE, (OZEMPIC, 1 MG/DOSE,) 2 MG/1.5ML Solution Pen-injector, Inject 1 mg as instructed every 7 days, Disp: 6 mL, Rfl: 2  •  Ropinirole HCl (REQUIP XL) 6 MG TABLET SR 24 HR, Take 1 tablet by mouth at  "bedtime as needed., Disp: 90 tablet, Rfl: 1  •  rosuvastatin (CRESTOR) 40 MG tablet, Take 1 tablet by mouth every evening., Disp: 90 tablet, Rfl: 3  •  furosemide (LASIX) 40 MG Tab, TAKE ONE TABLET BY MOUTH EVERY DAY, Disp: 30 tablet, Rfl: 0  •  potassium chloride SA (KDUR) 20 MEQ Tab CR, TAKE ONE TABLET BY MOUTH TWO TIMES A DAY, Disp: 180 tablet, Rfl: 0  •  metoprolol (LOPRESSOR) 50 MG Tab, Take 1 Tab by mouth 2 times a day., Disp: 180 Tab, Rfl: 1  •  lisinopril (PRINIVIL) 10 MG Tab, Take 1 Tab by mouth 2 times a day., Disp: 180 Tab, Rfl: 3  •  ipratropium-albuterol (DUONEB) 0.5-2.5 (3) MG/3ML nebulizer solution, 3 mL by Nebulization route every four hours as needed for Shortness of Breath., Disp: 300 mL, Rfl: 5  •  metformin (GLUCOPHAGE) 1000 MG tablet, Take 1 Tab by mouth 2 times a day, with meals., Disp: 180 Tab, Rfl: 3  •  vitamin D, Ergocalciferol, (DRISDOL) 1.25 MG (77109 UT) Cap capsule, Take 1 Cap by mouth every 7 days., Disp: 15 Cap, Rfl: 2  •  aspirin (ASA) 81 MG Chew Tab chewable tablet, Take 1 Tab by mouth every day., Disp: 200 Tab, Rfl: 1  •  albuterol 108 (90 Base) MCG/ACT Aero Soln inhalation aerosol, Inhale 2 Puffs by mouth 2 times a day as needed for Shortness of Breath., Disp: 17 g, Rfl: 5  •  ipratropium-albuterol (COMBIVENT RESPIMAT)  MCG/ACT Aero Soln, Inhale 1 Puff by mouth 4 times a day., Disp: 12 g, Rfl: 1  •  Insulin Pen Needle (PEN NEEDLES) 32G X 4 MM Misc, 1 Each by Does not apply route every day., Disp: 18 Each, Rfl: 3  •  glucose blood strip, 1 Strip by Other route every day., Disp: 100 Strip, Rfl: 1    Allergies, past medical history, past surgical history, family history, social history reviewed and updated    Objective:     Vitals: /82 (BP Location: Left arm, Patient Position: Sitting, BP Cuff Size: Adult)   Pulse 75   Temp 36.2 °C (97.2 °F) (Temporal)   Resp 16   Ht 1.854 m (6' 1\")   Wt 99.8 kg (220 lb)   SpO2 92%   BMI 29.03 kg/m²   General: Alert, pleasant, " NAD  EYES:   PERRL, EOMI, no icterus or pallor.  Conjunctivae and lids normal.   HENT:  Normocephalic.  External ears normal. Tympanic membranes pearly, opaque. Neck supple.     Heart: Regular rate and rhythm.  S1 and S2 normal.  No murmurs appreciated.  Respiratory: Decreased to absent breath sounds all lung fields.  Abdomen: Not obese  Skin: Warm, dry, no rashes.  Musculoskeletal: Gait is normal.  Moves all extremities well.    Extremities: Normal range of motion all extremities..   Neurological: No tremors, sensation grossly intact, gait is normal, CN2-12 intact.  Psych:  Affect/mood is normal, judgement is good, memory is intact, grooming is appropriate.    Assessment/Plan:     Mateo DAMON was seen today for medication refill, lab results and shortness of breath.    Diagnoses and all orders for this visit:    Type 2 diabetes mellitus with other circulatory complication, without long-term current use of insulin (HCC)  -     Semaglutide, 1 MG/DOSE, (OZEMPIC, 1 MG/DOSE,) 2 MG/1.5ML Solution Pen-injector; Inject 1 mg as instructed every 7 days  -     Lipid Profile; Future  -     Comp Metabolic Panel; Future  -     HEMOGLOBIN A1C; Future  -     MICROALBUMIN CREAT RATIO URINE; Future  Type 2 diabetes mellitus without complication, without long-term current use of insulin ([facility])  -     Lipid Profile; Future  -     Comp Metabolic Panel; Future  -     HEMOGLOBIN A1C; Future  -     MICROALBUMIN CREAT RATIO URINE; Future  Other hyperlipidemia  -     rosuvastatin (CRESTOR) 40 MG tablet; Take 1 tablet by mouth every evening.  Essential hypertension  Obesity (BMI 30-39.9)    Labs are stable.  Although sodium is low.  We will need to continue to monitor this.  We will repeat labs in 3 months and we have refilled his Crestor and Ozempic at this time.  We will need to do monofilament test at his next visit.    COVID-19  -     fluticasone-salmeterol (ADVAIR) 250-50 MCG/DOSE AEROSOL POWDER, BREATH ACTIVATED; Inhale 1 Puff every 12  hours.  Shortness of breath  COPD with acute exacerbation (HCC)  -     DX-CHEST-2 VIEWS; Future  -     fluticasone-salmeterol (ADVAIR) 250-50 MCG/DOSE AEROSOL POWDER, BREATH ACTIVATED; Inhale 1 Puff every 12 hours.  Acute on chronic combined systolic and diastolic congestive heart failure (HCC)    There is concern for COPD exacerbation and an exacerbation of his congestive heart failure.  He was diagnosed and hospitalized with Covid.  Since then he continues to experience shortness of breath.  We will add an Advair inhaler to see if this will help.  We will obtain a chest x-ray and patient has a planned follow-up appointment with cardiology.  The plan will be to follow-up in 2 to 4 weeks to see how he is doing with the Advair, sooner if needed.  I am hesitant to do a shingles vaccination at this time when patient is still recovering from Covid.    Restless leg syndrome, controlled  -     Ropinirole HCl (REQUIP XL) 6 MG TABLET SR 24 HR; Take 1 tablet by mouth at bedtime as needed.    I have increased the Requip from 5 mg to 6 mg.        Return in about 2 weeks (around 5/12/2021), or if symptoms worsen or fail to improve, for 2-4 week.    Please note that this dictation was created using voice recognition software. I have made every reasonable attempt to correct obvious errors, but expect that there are errors of grammar and possible content that I did not discover before finalizing note.

## 2021-05-03 ENCOUNTER — TELEPHONE (OUTPATIENT)
Dept: MEDICAL GROUP | Facility: CLINIC | Age: 66
End: 2021-05-03

## 2021-05-03 NOTE — TELEPHONE ENCOUNTER
DOCUMENTATION OF PAR STATUS:    1. Name of Medication & Dose: Ozempic     2. Name of Prescription Coverage Company & phone #: [employer]    3. Date Prior Auth Submitted: 5.3.21    4. What information was given to obtain insurance decision? Clinical Notes    5. Prior Auth Status? Pending    6. Patient Notified: N\A

## 2021-05-03 NOTE — TELEPHONE ENCOUNTER
DOCUMENTATION OF PAR STATUS:    1. Name of Medication & Dose: Ropinirole 6mg     2. Name of Prescription Coverage Company & phone #: [employer]    3. Date Prior Auth Submitted: 5.3.21    4. What information was given to obtain insurance decision? Clinical Notes    5. Prior Auth Status? Pending    6. Patient Notified: N\A

## 2021-06-07 ENCOUNTER — NON-PROVIDER VISIT (OUTPATIENT)
Dept: MEDICAL GROUP | Facility: CLINIC | Age: 66
End: 2021-06-07
Payer: MEDICARE

## 2021-06-07 ENCOUNTER — HOSPITAL ENCOUNTER (OUTPATIENT)
Facility: MEDICAL CENTER | Age: 66
End: 2021-06-07
Attending: PHYSICIAN ASSISTANT
Payer: MEDICARE

## 2021-06-07 DIAGNOSIS — Z01.89 ENCOUNTER FOR LABORATORY TEST: ICD-10-CM

## 2021-06-07 PROCEDURE — 82043 UR ALBUMIN QUANTITATIVE: CPT

## 2021-06-07 PROCEDURE — 80053 COMPREHEN METABOLIC PANEL: CPT

## 2021-06-07 PROCEDURE — 36415 COLL VENOUS BLD VENIPUNCTURE: CPT | Performed by: PHYSICIAN ASSISTANT

## 2021-06-07 PROCEDURE — 83036 HEMOGLOBIN GLYCOSYLATED A1C: CPT | Mod: GA

## 2021-06-07 PROCEDURE — 80061 LIPID PANEL: CPT

## 2021-06-07 PROCEDURE — 82570 ASSAY OF URINE CREATININE: CPT

## 2021-06-08 ENCOUNTER — OFFICE VISIT (OUTPATIENT)
Dept: MEDICAL GROUP | Facility: CLINIC | Age: 66
End: 2021-06-08
Payer: MEDICARE

## 2021-06-08 VITALS
TEMPERATURE: 97.6 F | HEIGHT: 73 IN | WEIGHT: 213 LBS | RESPIRATION RATE: 16 BRPM | SYSTOLIC BLOOD PRESSURE: 132 MMHG | BODY MASS INDEX: 28.23 KG/M2 | OXYGEN SATURATION: 91 % | HEART RATE: 80 BPM | DIASTOLIC BLOOD PRESSURE: 68 MMHG

## 2021-06-08 DIAGNOSIS — J44.1 COPD WITH ACUTE EXACERBATION (HCC): ICD-10-CM

## 2021-06-08 DIAGNOSIS — R79.89 ELEVATED LFTS: ICD-10-CM

## 2021-06-08 DIAGNOSIS — G25.81 RESTLESS LEG SYNDROME, CONTROLLED: ICD-10-CM

## 2021-06-08 DIAGNOSIS — I10 ESSENTIAL HYPERTENSION: ICD-10-CM

## 2021-06-08 DIAGNOSIS — I50.43 ACUTE ON CHRONIC COMBINED SYSTOLIC AND DIASTOLIC CONGESTIVE HEART FAILURE (HCC): ICD-10-CM

## 2021-06-08 DIAGNOSIS — N28.9 FUNCTION KIDNEY DECREASED: ICD-10-CM

## 2021-06-08 DIAGNOSIS — E11.59 TYPE 2 DIABETES MELLITUS WITH OTHER CIRCULATORY COMPLICATION, WITHOUT LONG-TERM CURRENT USE OF INSULIN (HCC): ICD-10-CM

## 2021-06-08 DIAGNOSIS — E78.49 OTHER HYPERLIPIDEMIA: ICD-10-CM

## 2021-06-08 DIAGNOSIS — L20.89 OTHER ATOPIC DERMATITIS: ICD-10-CM

## 2021-06-08 DIAGNOSIS — E11.9 TYPE 2 DIABETES MELLITUS WITHOUT COMPLICATION, WITHOUT LONG-TERM CURRENT USE OF INSULIN (HCC): ICD-10-CM

## 2021-06-08 LAB
ALBUMIN SERPL BCP-MCNC: 3.9 G/DL (ref 3.2–4.9)
ALBUMIN/GLOB SERPL: 1.1 G/DL
ALP SERPL-CCNC: 217 U/L (ref 30–99)
ALT SERPL-CCNC: 191 U/L (ref 2–50)
ANION GAP SERPL CALC-SCNC: 9 MMOL/L (ref 7–16)
AST SERPL-CCNC: 162 U/L (ref 12–45)
BILIRUB SERPL-MCNC: 0.9 MG/DL (ref 0.1–1.5)
BUN SERPL-MCNC: 34 MG/DL (ref 8–22)
CALCIUM SERPL-MCNC: 9.2 MG/DL (ref 8.5–10.5)
CHLORIDE SERPL-SCNC: 97 MMOL/L (ref 96–112)
CHOLEST SERPL-MCNC: 106 MG/DL (ref 100–199)
CO2 SERPL-SCNC: 28 MMOL/L (ref 20–33)
CREAT SERPL-MCNC: 1.61 MG/DL (ref 0.5–1.4)
CREAT UR-MCNC: 69.13 MG/DL
EST. AVERAGE GLUCOSE BLD GHB EST-MCNC: 174 MG/DL
GLOBULIN SER CALC-MCNC: 3.6 G/DL (ref 1.9–3.5)
GLUCOSE SERPL-MCNC: 143 MG/DL (ref 65–99)
HBA1C MFR BLD: 7.7 % (ref 4–5.6)
HDLC SERPL-MCNC: 31 MG/DL
LDLC SERPL CALC-MCNC: 48 MG/DL
MICROALBUMIN UR-MCNC: 15.4 MG/DL
MICROALBUMIN/CREAT UR: 223 MG/G (ref 0–30)
POTASSIUM SERPL-SCNC: 4.1 MMOL/L (ref 3.6–5.5)
PROT SERPL-MCNC: 7.5 G/DL (ref 6–8.2)
SODIUM SERPL-SCNC: 134 MMOL/L (ref 135–145)
TRIGL SERPL-MCNC: 136 MG/DL (ref 0–149)

## 2021-06-08 PROCEDURE — 99214 OFFICE O/P EST MOD 30 MIN: CPT | Performed by: PHYSICIAN ASSISTANT

## 2021-06-08 RX ORDER — SEMAGLUTIDE 1.34 MG/ML
INJECTION, SOLUTION SUBCUTANEOUS
Qty: 6 ML | Refills: 2 | Status: SHIPPED | OUTPATIENT
Start: 2021-06-08

## 2021-06-08 RX ORDER — BETAMETHASONE DIPROPIONATE 0.05 %
OINTMENT (GRAM) TOPICAL
Qty: 60 G | Refills: 0 | Status: SHIPPED | OUTPATIENT
Start: 2021-06-08

## 2021-06-08 RX ORDER — PRAMIPEXOLE DIHYDROCHLORIDE 1 MG/1
1 TABLET ORAL NIGHTLY PRN
Qty: 30 TABLET | Refills: 1 | Status: SHIPPED | OUTPATIENT
Start: 2021-06-08 | End: 2021-08-10

## 2021-06-08 ASSESSMENT — FIBROSIS 4 INDEX: FIB4 SCORE: 3.53

## 2021-06-08 NOTE — PROGRESS NOTES
cc:  Lab results    Subjective:     Mateo Hunt is a 65 y.o. male presenting for lab results      Patient presents to the office for lab results. Patient states that he has an appointment to see the cardiologist in a day or two.  He states that he has been having shortness of breath but not all the time. It was worse 2-3 weeks ago but it has been getting better.  It was so bad that he would have to sit down as he was so winded. He has been taking his inhalers for this. He has been using the nebulizer.  In fact the nebulizer has helped significantly.  He is being seen tomorrow for an echo.He sees Dr Hargrove with cardiology.     Patient is also here to go over his most recent labs.  He does acknowledge that he has been using more ibuprofen due to his restless legs as he has not been able to get his pain symptoms under control.  His insurance recently stopped Requip and he has not been sure what to do his symptoms have been quite severe.  He also acknowledges drinking more alcohol-taking more shots to try and medicate his symptoms.    Patient is also here to follow-up with labs.  He does have type 2 diabetes, hypertension and hyperlipidemia.    Patient indicates that he has been having problems with his hands cracking and having significant dryness.  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  He would like to have these evaluated.    Review of systems:  See above.   Denies any symptoms unless previously indicated.        Current Outpatient Medications:   •  pramipexole (MIRAPEX) 1 MG Tab, Take 1 tablet by mouth at bedtime as needed., Disp: 30 tablet, Rfl: 1  •  betamethasone dipropionate (DIPROLENE) 0.05 % Ointment, Apply a small amount to the affected area twice a day no more than 10 days., Disp: 60 g, Rfl: 0  •  Semaglutide, 1 MG/DOSE, (OZEMPIC, 1 MG/DOSE,) 2 MG/1.5ML Solution Pen-injector, Inject 1 mg as instructed every 7 days,  "Disp: 6 mL, Rfl: 2  •  furosemide (LASIX) 40 MG Tab, TAKE ONE TABLET BY MOUTH EVERY DAY, Disp: 90 tablet, Rfl: 1  •  fluticasone-salmeterol (ADVAIR) 250-50 MCG/DOSE AEROSOL POWDER, BREATH ACTIVATED, Inhale 1 Puff every 12 hours., Disp: 1 Each, Rfl: 2  •  rosuvastatin (CRESTOR) 40 MG tablet, Take 1 tablet by mouth every evening., Disp: 90 tablet, Rfl: 3  •  potassium chloride SA (KDUR) 20 MEQ Tab CR, TAKE ONE TABLET BY MOUTH TWO TIMES A DAY, Disp: 180 tablet, Rfl: 0  •  metoprolol (LOPRESSOR) 50 MG Tab, Take 1 Tab by mouth 2 times a day., Disp: 180 Tab, Rfl: 1  •  lisinopril (PRINIVIL) 10 MG Tab, Take 1 Tab by mouth 2 times a day., Disp: 180 Tab, Rfl: 3  •  ipratropium-albuterol (DUONEB) 0.5-2.5 (3) MG/3ML nebulizer solution, 3 mL by Nebulization route every four hours as needed for Shortness of Breath., Disp: 300 mL, Rfl: 5  •  metformin (GLUCOPHAGE) 1000 MG tablet, Take 1 Tab by mouth 2 times a day, with meals., Disp: 180 Tab, Rfl: 3  •  aspirin (ASA) 81 MG Chew Tab chewable tablet, Take 1 Tab by mouth every day., Disp: 200 Tab, Rfl: 1  •  albuterol 108 (90 Base) MCG/ACT Aero Soln inhalation aerosol, Inhale 2 Puffs by mouth 2 times a day as needed for Shortness of Breath., Disp: 17 g, Rfl: 5  •  ipratropium-albuterol (COMBIVENT RESPIMAT)  MCG/ACT Aero Soln, Inhale 1 Puff by mouth 4 times a day., Disp: 12 g, Rfl: 1  •  Insulin Pen Needle (PEN NEEDLES) 32G X 4 MM Misc, 1 Each by Does not apply route every day., Disp: 18 Each, Rfl: 3  •  glucose blood strip, 1 Strip by Other route every day., Disp: 100 Strip, Rfl: 1  •  vitamin D, Ergocalciferol, (DRISDOL) 1.25 MG (65252 UT) Cap capsule, Take 1 Cap by mouth every 7 days. (Patient not taking: Reported on 6/8/2021), Disp: 15 Cap, Rfl: 2    Allergies, past medical history, past surgical history, family history, social history reviewed and updated    Objective:     Vitals: /68   Pulse 80   Temp 36.4 °C (97.6 °F) (Temporal)   Resp 16   Ht 1.854 m (6' 1\")   " Wt 96.6 kg (213 lb)   SpO2 91%   BMI 28.10 kg/m²   General: Alert, pleasant, NAD  EYES:   PERRL, EOMI, no icterus or pallor.  Conjunctivae and lids normal.   HENT:  Normocephalic.  External ears normal.   Neck supple.  .  Respiratory: Normal respiratory effort.   Abdomen: overweight.  Skin: Warm, dry, dermatitis build up with white plaques across knuckles hands bilaterally.  Musculoskeletal: Gait is normal.  Moves all extremities well.    Neurological: No tremors, gait is normal, CN2-12 intact.  Psych:  Affect/mood is normal, judgement is good, memory is intact, grooming is appropriate.    Assessment/Plan:     Mateo was seen today for lab results.    Diagnoses and all orders for this visit:    Acute on chronic combined systolic and diastolic congestive heart failure (HCC)  COPD with acute exacerbation (HCC)    Both may be a reason for his shortness of breath.  His shortness of breath does improve when he uses his nebulizer, he states quite extensively.  He is having an echo tomorrow and so we will look to see if this is cardiac related.  He also has an upcoming appointment with cardiology on the 17th of this month.    Elevated LFTs  -     US-RUQ; Future  -     Comp Metabolic Panel; Future  -     CBC WITH DIFFERENTIAL; Future  -     HEP C VIRUS ANTIBODY; Future  -     LDH; Future  -     HEP B CORE AB TOTAL; Future    Potentially due to increased alcohol intake.  However I would like to evaluate this further which would include an ultrasound of the liver, and further lab work.  I have recommended that patient decrease his alcohol intake as well.    Function kidney decreased  -     Comp Metabolic Panel; Future  -     CBC WITH DIFFERENTIAL; Future    Patient is diabetic.  He has had increased ibuprofen use due to his restless legs.  Recommend he decrease his ibuprofen use and we will recheck his labs.    Restless leg syndrome, controlled  -     pramipexole (MIRAPEX) 1 MG Tab; Take 1 tablet by mouth at bedtime as  needed.    As Requip is no longer covered, we will try patient on Mirapex to see if this will help his symptoms.  The plan will be to follow-up in 6 to 8 weeks, sooner if needed.    Type 2 diabetes mellitus with other circulatory complication, without long-term current use of insulin (HCC)  -     Semaglutide, 1 MG/DOSE, (OZEMPIC, 1 MG/DOSE,) 2 MG/1.5ML Solution Pen-injector; Inject 1 mg as instructed every 7 days  Essential hypertension  Other hyperlipidemia    Blood pressure controlled, cholesterol slightly elevated.  Diabetes improved.  At this time would recommend a follow-up in approximately 4 months.    Other atopic dermatitis  -     betamethasone dipropionate (DIPROLENE) 0.05 % Ointment; Apply a small amount to the affected area twice a day no more than 10 days.    We will try patient on betamethasone ointment due to the thick plaques.  Our plan will be to follow-up in 6 weeks and patient has been instructed on medication use.        Return if symptoms worsen or fail to improve, for 6-8 weeks.    Please note that this dictation was created using voice recognition software. I have made every reasonable attempt to correct obvious errors, but expect that there are errors of grammar and possible content that I did not discover before finalizing note.

## 2021-06-09 ENCOUNTER — APPOINTMENT (OUTPATIENT)
Dept: CARDIOLOGY | Facility: MEDICAL CENTER | Age: 66
End: 2021-06-09
Attending: INTERNAL MEDICINE
Payer: MEDICARE

## 2021-06-17 ENCOUNTER — OFFICE VISIT (OUTPATIENT)
Dept: CARDIOLOGY | Facility: PHYSICIAN GROUP | Age: 66
End: 2021-06-17
Payer: MEDICARE

## 2021-06-17 VITALS
BODY MASS INDEX: 28.76 KG/M2 | HEART RATE: 78 BPM | SYSTOLIC BLOOD PRESSURE: 116 MMHG | OXYGEN SATURATION: 93 % | DIASTOLIC BLOOD PRESSURE: 64 MMHG | HEIGHT: 73 IN | WEIGHT: 217 LBS

## 2021-06-17 DIAGNOSIS — I50.42 CHRONIC COMBINED SYSTOLIC AND DIASTOLIC CONGESTIVE HEART FAILURE (HCC): Chronic | ICD-10-CM

## 2021-06-17 DIAGNOSIS — I10 ESSENTIAL HYPERTENSION: ICD-10-CM

## 2021-06-17 DIAGNOSIS — I49.3 PVC (PREMATURE VENTRICULAR CONTRACTION): ICD-10-CM

## 2021-06-17 DIAGNOSIS — R93.1 LEFT VENTRICULAR EJECTION FRACTION LESS THAN 40%: ICD-10-CM

## 2021-06-17 PROCEDURE — 99214 OFFICE O/P EST MOD 30 MIN: CPT | Performed by: INTERNAL MEDICINE

## 2021-06-17 RX ORDER — THIAMINE HCL 100 MG
500 TABLET ORAL DAILY
COMMUNITY

## 2021-06-17 ASSESSMENT — ENCOUNTER SYMPTOMS
NAUSEA: 0
DIZZINESS: 0
PALPITATIONS: 0
CHILLS: 0
BLURRED VISION: 0
WEAKNESS: 0
SHORTNESS OF BREATH: 0
COUGH: 0
ABDOMINAL PAIN: 0
FOCAL WEAKNESS: 0
FEVER: 0
PND: 0
CLAUDICATION: 0
BRUISES/BLEEDS EASILY: 0
SORE THROAT: 0
FALLS: 0

## 2021-06-17 ASSESSMENT — FIBROSIS 4 INDEX: FIB4 SCORE: 3.53

## 2021-06-17 NOTE — PROGRESS NOTES
Chief Complaint   Patient presents with   • Premature Ventricular Contractions (PVCs)     F/V Dx: ft ventricular ejection fraction less than 40%   • HTN (Controlled)       Subjective:   Mateo Hunt is a 65 y.o. male who presents today for follow-up of his history of mildly reduced ejection fraction    He was admitted to [facility] for pneumonia and Covid back in December    Has had more HWANG    recentl labs showed increased LFTs    Past Medical History:   Diagnosis Date   • Hypertension      History reviewed. No pertinent surgical history.  Family History   Problem Relation Age of Onset   • Heart Disease Father 65        CABG   • Heart Disease Paternal Uncle      Social History     Socioeconomic History   • Marital status:      Spouse name: Not on file   • Number of children: Not on file   • Years of education: Not on file   • Highest education level: Not on file   Occupational History   • Not on file   Tobacco Use   • Smoking status: Former Smoker     Packs/day: 0.75     Years: 45.00     Pack years: 33.75     Types: Cigarettes     Quit date: 2/10/2019     Years since quittin.3   • Smokeless tobacco: Never Used   • Tobacco comment: 4-5 cigarettes/day    Vaping Use   • Vaping Use: Never used   Substance and Sexual Activity   • Alcohol use: Yes     Alcohol/week: 4.8 oz     Types: 8 Cans of beer per week     Comment: 6-8 beers a week    • Drug use: No   • Sexual activity: Yes     Partners: Female   Other Topics Concern   • Not on file   Social History Narrative   • Not on file     Social Determinants of Health     Financial Resource Strain:    • Difficulty of Paying Living Expenses:    Food Insecurity:    • Worried About Running Out of Food in the Last Year:    • Ran Out of Food in the Last Year:    Transportation Needs:    • Lack of Transportation (Medical):    • Lack of Transportation (Non-Medical):    Physical Activity:    • Days of Exercise per Week:    • Minutes of Exercise per Session:     Stress:    • Feeling of Stress :    Social Connections:    • Frequency of Communication with Friends and Family:    • Frequency of Social Gatherings with Friends and Family:    • Attends Bahai Services:    • Active Member of Clubs or Organizations:    • Attends Club or Organization Meetings:    • Marital Status:    Intimate Partner Violence:    • Fear of Current or Ex-Partner:    • Emotionally Abused:    • Physically Abused:    • Sexually Abused:      No Known Allergies  Outpatient Encounter Medications as of 6/17/2021   Medication Sig Dispense Refill   • Zinc Sulfate (ZINC 15 PO) Take  by mouth every day.     • Magnesium 500 MG Tab Take 500 mg by mouth every day.     • potassium chloride SA (KDUR) 20 MEQ Tab CR TAKE ONE TABLET BY MOUTH TWO TIMES A  tablet 2   • pramipexole (MIRAPEX) 1 MG Tab Take 1 tablet by mouth at bedtime as needed. 30 tablet 1   • betamethasone dipropionate (DIPROLENE) 0.05 % Ointment Apply a small amount to the affected area twice a day no more than 10 days. 60 g 0   • Semaglutide, 1 MG/DOSE, (OZEMPIC, 1 MG/DOSE,) 2 MG/1.5ML Solution Pen-injector Inject 1 mg as instructed every 7 days 6 mL 2   • furosemide (LASIX) 40 MG Tab TAKE ONE TABLET BY MOUTH EVERY DAY 90 tablet 1   • fluticasone-salmeterol (ADVAIR) 250-50 MCG/DOSE AEROSOL POWDER, BREATH ACTIVATED Inhale 1 Puff every 12 hours. 1 Each 2   • rosuvastatin (CRESTOR) 40 MG tablet Take 1 tablet by mouth every evening. 90 tablet 3   • metoprolol (LOPRESSOR) 50 MG Tab Take 1 Tab by mouth 2 times a day. 180 Tab 1   • lisinopril (PRINIVIL) 10 MG Tab Take 1 Tab by mouth 2 times a day. 180 Tab 3   • ipratropium-albuterol (DUONEB) 0.5-2.5 (3) MG/3ML nebulizer solution 3 mL by Nebulization route every four hours as needed for Shortness of Breath. 300 mL 5   • metformin (GLUCOPHAGE) 1000 MG tablet Take 1 Tab by mouth 2 times a day, with meals. 180 Tab 3   • aspirin (ASA) 81 MG Chew Tab chewable tablet Take 1 Tab by mouth every day. 200 Tab  "1   • albuterol 108 (90 Base) MCG/ACT Aero Soln inhalation aerosol Inhale 2 Puffs by mouth 2 times a day as needed for Shortness of Breath. 17 g 5   • ipratropium-albuterol (COMBIVENT RESPIMAT)  MCG/ACT Aero Soln Inhale 1 Puff by mouth 4 times a day. 12 g 1   • Insulin Pen Needle (PEN NEEDLES) 32G X 4 MM Misc 1 Each by Does not apply route every day. 18 Each 3   • glucose blood strip 1 Strip by Other route every day. 100 Strip 1   • vitamin D, Ergocalciferol, (DRISDOL) 1.25 MG (68689 UT) Cap capsule Take 1 Cap by mouth every 7 days. (Patient not taking: Reported on 6/8/2021) 15 Cap 2     No facility-administered encounter medications on file as of 6/17/2021.     Review of Systems   Constitutional: Negative for chills and fever.   HENT: Negative for sore throat.    Eyes: Negative for blurred vision.   Respiratory: Negative for cough and shortness of breath.    Cardiovascular: Negative for chest pain, palpitations, claudication, leg swelling and PND.   Gastrointestinal: Negative for abdominal pain and nausea.   Musculoskeletal: Negative for falls and joint pain.   Skin: Negative for rash.   Neurological: Negative for dizziness, focal weakness and weakness.   Endo/Heme/Allergies: Does not bruise/bleed easily.        Objective:   /64 (BP Location: Left arm, Patient Position: Sitting, BP Cuff Size: Adult)   Pulse 78   Ht 1.854 m (6' 1\")   Wt 98.4 kg (217 lb)   SpO2 93%   BMI 28.63 kg/m²     Physical Exam   Constitutional: No distress.   Eyes: No scleral icterus.   Neck: No JVD present.   Cardiovascular: Normal rate and normal heart sounds. Exam reveals no gallop and no friction rub.   No murmur heard.  Pulmonary/Chest: No respiratory distress. He has no wheezes. He has no rales.   Abdominal: Soft. Bowel sounds are normal.   Neurological: He is alert.   Skin: No rash noted. He is not diaphoretic.     We reviewed in person the most recent labs  Recent Results (from the past 5040 hour(s))   Lipid Profile    " Collection Time: 04/26/21  7:20 AM   Result Value Ref Range    Cholesterol,Tot 150 100 - 199 mg/dL    Triglycerides 118 0 - 149 mg/dL    HDL 33 (A) >=40 mg/dL    LDL 93 <100 mg/dL   Comp Metabolic Panel    Collection Time: 04/26/21  7:20 AM   Result Value Ref Range    Sodium 134 (L) 135 - 145 mmol/L    Potassium 4.2 3.6 - 5.5 mmol/L    Chloride 97 96 - 112 mmol/L    Co2 29 20 - 33 mmol/L    Anion Gap 8.0 7.0 - 16.0    Glucose 87 65 - 99 mg/dL    Bun 21 8 - 22 mg/dL    Creatinine 1.19 0.50 - 1.40 mg/dL    Calcium 9.9 8.5 - 10.5 mg/dL    AST(SGOT) 33 12 - 45 U/L    ALT(SGPT) 29 2 - 50 U/L    Alkaline Phosphatase 111 (H) 30 - 99 U/L    Total Bilirubin 0.6 0.1 - 1.5 mg/dL    Albumin 4.3 3.2 - 4.9 g/dL    Total Protein 7.8 6.0 - 8.2 g/dL    Globulin 3.5 1.9 - 3.5 g/dL    A-G Ratio 1.2 g/dL   ESTIMATED GFR    Collection Time: 04/26/21  7:20 AM   Result Value Ref Range    GFR If African American >60 >60 mL/min/1.73 m 2    GFR If Non African American >60 >60 mL/min/1.73 m 2   Lipid Profile    Collection Time: 06/07/21  7:20 AM   Result Value Ref Range    Cholesterol,Tot 106 100 - 199 mg/dL    Triglycerides 136 0 - 149 mg/dL    HDL 31 (A) >=40 mg/dL    LDL 48 <100 mg/dL   Comp Metabolic Panel    Collection Time: 06/07/21  7:20 AM   Result Value Ref Range    Sodium 134 (L) 135 - 145 mmol/L    Potassium 4.1 3.6 - 5.5 mmol/L    Chloride 97 96 - 112 mmol/L    Co2 28 20 - 33 mmol/L    Anion Gap 9.0 7.0 - 16.0    Glucose 143 (H) 65 - 99 mg/dL    Bun 34 (H) 8 - 22 mg/dL    Creatinine 1.61 (H) 0.50 - 1.40 mg/dL    Calcium 9.2 8.5 - 10.5 mg/dL    AST(SGOT) 162 (H) 12 - 45 U/L    ALT(SGPT) 191 (H) 2 - 50 U/L    Alkaline Phosphatase 217 (H) 30 - 99 U/L    Total Bilirubin 0.9 0.1 - 1.5 mg/dL    Albumin 3.9 3.2 - 4.9 g/dL    Total Protein 7.5 6.0 - 8.2 g/dL    Globulin 3.6 (H) 1.9 - 3.5 g/dL    A-G Ratio 1.1 g/dL   HEMOGLOBIN A1C    Collection Time: 06/07/21  7:20 AM   Result Value Ref Range    Glycohemoglobin 7.7 (H) 4.0 - 5.6 %    Est  Avg Glucose 174 mg/dL   MICROALBUMIN CREAT RATIO URINE    Collection Time: 06/07/21  7:20 AM   Result Value Ref Range    Creatinine, Urine 69.13 mg/dL    Microalbumin, Urine Random 15.4 mg/dL    Micro Alb Creat Ratio 223 (H) 0 - 30 mg/g   ESTIMATED GFR    Collection Time: 06/07/21  7:20 AM   Result Value Ref Range    GFR If  52 (A) >60 mL/min/1.73 m 2    GFR If Non  43 (A) >60 mL/min/1.73 m 2       Assessment:     1. Chronic combined systolic and diastolic congestive heart failure (HCC)     2. Essential hypertension     3. Left ventricular ejection fraction less than 40%     4. PVC (premature ventricular contraction)         Medical Decision Making:  Today's Assessment / Status / Plan:     It was my pleasure to meet with Mr. Hunt.    We addressed the management of hypertension at today's visit. Blood pressure is well controlled.  We specifically assessed the labs on hypertension treatment    We addressed the management of dyslipidemia at today's visit. He is on appropriate statin.    We discussed that you should talk with primary care (or endocrinology) about oral medication: empagliflozin (JARDIANCE®  Preferred for CAD), dapagliflozin (FARXIGA®, preferred for CHF),  there are cardiovascular benefits but there are also risks.  You may also want to consider liraglutide (Victoza®), semaglutide (Ozempic®), dulaglutide (Trulicity®) which are injection with cardiovascular benefits but also risks.    I will see Mr. Hunt back in 6 months time and encouraged him to follow up with us over the phone or electronically using my MyChart as issues arise.    It is my pleasure to participate in the care of Mr. Hunt.  Please do not hesitate to contact me with questions or concerns.    Aurelio Hargrove MD PhD [facility]  Cardiologist [facility]    Please note that this dictation was created using voice recognition software. There may be errors I did not  discover before finalizing the note.

## 2021-07-08 DIAGNOSIS — R79.89 ELEVATED LFTS: ICD-10-CM

## 2022-03-03 NOTE — ASSESSMENT & PLAN NOTE
This patient has lost approximately 6 pounds over the past 2 months due to improved exercise and slightly improved diet. He remains dependent on donations for food so his food quality is not always superb but is working hard at improving this.   STAT K on admit

## 2022-04-20 NOTE — TELEPHONE ENCOUNTER
Was the patient seen in the last year in this department? Yes     Does patient have an active prescription for medications requested? No     Received Request Via: Pharmacy   depression screening

## 2022-07-27 ENCOUNTER — TELEPHONE (OUTPATIENT)
Dept: SCHEDULING | Facility: IMAGING CENTER | Age: 67
End: 2022-07-27

## 2022-07-27 NOTE — TELEPHONE ENCOUNTER
Reggie Galindo,     Your provider Nicole Stevenson indicated during your last visit they would like to see you for diabetes management every 6 months. It is very important to get this appointment scheduled so can have your A1C checked and make certain we are doing all we can to keep your diabetes well controlled which adds to your longevity.     Please call us at [phone], to schedule your appointment.     Sincerely,   Your Healthcare Team